# Patient Record
Sex: FEMALE | Race: WHITE | NOT HISPANIC OR LATINO | Employment: FULL TIME | URBAN - METROPOLITAN AREA
[De-identification: names, ages, dates, MRNs, and addresses within clinical notes are randomized per-mention and may not be internally consistent; named-entity substitution may affect disease eponyms.]

---

## 2019-01-16 ENCOUNTER — TELEPHONE (OUTPATIENT)
Dept: NEUROLOGY | Facility: CLINIC | Age: 69
End: 2019-01-16

## 2019-02-13 ENCOUNTER — TELEPHONE (OUTPATIENT)
Dept: NEUROLOGY | Facility: CLINIC | Age: 69
End: 2019-02-13

## 2019-02-13 RX ORDER — PAROXETINE HYDROCHLORIDE 20 MG/1
TABLET, FILM COATED ORAL
Refills: 0 | COMMUNITY
Start: 2019-01-08

## 2019-02-13 RX ORDER — ATORVASTATIN CALCIUM 20 MG/1
20 TABLET, FILM COATED ORAL DAILY
Refills: 0 | COMMUNITY
Start: 2019-01-08

## 2019-02-13 RX ORDER — LEVOTHYROXINE SODIUM 175 MCG
175 TABLET ORAL DAILY
Refills: 0 | COMMUNITY
Start: 2019-01-08 | End: 2021-10-26

## 2019-02-13 RX ORDER — TRIAMTERENE AND HYDROCHLOROTHIAZIDE 37.5; 25 MG/1; MG/1
1 TABLET ORAL DAILY
Refills: 0 | COMMUNITY
Start: 2019-01-08

## 2019-02-14 ENCOUNTER — OFFICE VISIT (OUTPATIENT)
Dept: NEUROLOGY | Facility: CLINIC | Age: 69
End: 2019-02-14
Payer: MEDICARE

## 2019-02-14 VITALS
SYSTOLIC BLOOD PRESSURE: 142 MMHG | BODY MASS INDEX: 33.91 KG/M2 | HEIGHT: 66 IN | HEART RATE: 97 BPM | WEIGHT: 211 LBS | DIASTOLIC BLOOD PRESSURE: 80 MMHG

## 2019-02-14 DIAGNOSIS — G43.109 OCULAR MIGRAINE: ICD-10-CM

## 2019-02-14 DIAGNOSIS — I67.9 CEREBROVASCULAR SMALL VESSEL DISEASE: Primary | ICD-10-CM

## 2019-02-14 DIAGNOSIS — H53.9 VISUAL DISTURBANCES: ICD-10-CM

## 2019-02-14 PROCEDURE — 99205 OFFICE O/P NEW HI 60 MIN: CPT | Performed by: PSYCHIATRY & NEUROLOGY

## 2019-02-14 NOTE — PROGRESS NOTES
Patient ID: Sukumar Park is a 80 y o  female  Assessment/Plan:     Problem List Items Addressed This Visit        Cardiovascular and Mediastinum    Cerebrovascular small vessel disease - Primary    Relevant Orders    VAS carotid complete study    Ocular migraine    Relevant Medications    PARoxetine (PAXIL) 20 mg tablet       Other    Visual disturbances    Relevant Orders    VAS carotid complete study         Today I had the pleasure of seeing your patient, Mary Munguia , in consultation at [address]  Mrs Nilda Whipple has presented for evaluation of transient kaleidoscopic visit with no headaches or complete vision loss  I would agree that TIA vs ocular migraine are on differential  Patient has complete resolution of her symptoms at this point  Patient does have small vessels disease with involvement of left occipital area and periventricular region  No T1W black holes, no corpus callosum involvement - no signs of ischemic or hemorrhagic changes, no demyelenation  We agreed to consider completing work up with Carotid Doppler US and starting aspirin 81 mg daily for 3-6 months  No other focal neurologic deficit has been described  Patient reports forgetfulness, with no specific signs of dementia or parkinsonism has been noted  We agreed that if patient feels she is mor forgetful, she will be advised to be further evaluation at [address]  Follow up on as needed bases  Subjective: abnormal brain MRI    HPI /History of Present Illness  Mrs Nilda Whipple has a history of Graves disease , dizziness, depression, glaucoma suspect, HTN, who presented for evaluation of visual dysfunction and abnormal brain MRI  Patient had Graves disease for 19 years - 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, but her sister had eye involvement  Patient stated she had sensation of perception of kaleidoscope and ophthalmology team decided she had a ocular migraine     Patient had those vision changes up 10 episodes, with visual problem free for up to 6 weeks  Patient described vision changes in mostly bitemporal area, like wavy and changes, but no central scotoma described  Visual disturbances are more colorful  No specific triggers  Patient had migraines many years ago with no visual disturbances  Perception of kaleidoscope last 5-10 min, no double vision or vision loss, no focal neurologic deficit, no headaches or any head sensation  More forgetful but no word findings   Patient stated she was more stressful;    Ophthalmology had evaluated the patient with borderline glaucoma OS 22 and OD 24     The following portions of the patient's history were reviewed and updated as appropriate:   She  has a past medical history of Anxiety, Depression, Graves disease, Hypertension, Migraine, and Thyroid disease  She   Patient Active Problem List    Diagnosis Date Noted    Cerebrovascular small vessel disease 02/14/2019    Visual disturbances 02/14/2019    Ocular migraine 02/14/2019     She  has no past surgical history on file  Her family history includes Diabetes in her brother, mother, and sister; Parkinsonism in her father  She  reports that she has been smoking  She has never used smokeless tobacco  She reports that she drinks alcohol  She reports that she does not use drugs  Current Outpatient Medications   Medication Sig Dispense Refill    atorvastatin (LIPITOR) 20 mg tablet Take 20 mg by mouth daily  0    PARoxetine (PAXIL) 20 mg tablet Take 20 mg by mouth daily  0    SYNTHROID 175 MCG tablet Take 175 mcg by mouth daily  0    triamterene-hydrochlorothiazide (MAXZIDE-25) 37 5-25 mg per tablet Take 1 tablet by mouth daily  0     No current facility-administered medications for this visit        Current Outpatient Medications on File Prior to Visit   Medication Sig    atorvastatin (LIPITOR) 20 mg tablet Take 20 mg by mouth daily    PARoxetine (PAXIL) 20 mg tablet Take 20 mg by mouth daily    SYNTHROID 175 MCG tablet Take 175 mcg by mouth daily    triamterene-hydrochlorothiazide (MAXZIDE-25) 37 5-25 mg per tablet Take 1 tablet by mouth daily    [DISCONTINUED] diazepam (VALIUM) 2 mg tablet Take 1 tablet (2 mg total) by mouth every 6 (six) hours as needed (vertigo) for up to 10 doses  No current facility-administered medications on file prior to visit  She has No Known Allergies            Objective:    Blood pressure 142/80, pulse 97, height 5' 6" (1 676 m), weight 95 7 kg (211 lb)  Physical Exam/Neurological Exam  CONSTITUTIONAL: NAD, pleasant  NECK: supple, no lymphadenopathy, no thyromegaly, no JVD  CARDIOVASCULAR: RRR, normal S1S2, no murmurs, no rubs  RESP: clear to auscultation bilaterally, no wheezes/rhonchi/rales  ABDOMEN: soft, non tender, non distended  SKIN: no rash or skin lesions  EXTREMITIES: no edema, pulses 2+bilaterally  PSYCH: appropriate mood and affect  NEUROLOGIC COMPREHENSIVE EXAM: Patient is oriented to person, place and time, NAD; appropriate affect  CN II, III, IV, V, VI, VII,VIII,IX,X,XI-XII intact with EOMI, PERRLA, OKN intact, VF grossly intact, fundi poorly visualized secondary to pupillary constriction; symmetric face noted  Motor: 5/5 UE/LE bilateral symmetric; Sensory: intact to light touch and pinprick bilaterally; normal vibration sensation feet bilaterally; Coordination within normal limits on FTN and JHONATAN testing; DTR: 2/4 through, no Babinski, no clonus  Tandem gait is intact  Romberg: abnormal       ROS:  12 points of review of system was reviewed with the patient and was unremarkable with exception: see HPI  Review of Systems   Constitutional: Negative  HENT: Negative  Eyes: Negative  Respiratory: Negative  Cardiovascular: Negative  Gastrointestinal: Negative  Endocrine: Negative  Genitourinary: Negative  Musculoskeletal: Negative  Skin: Negative  Hair loss   Allergic/Immunologic: Negative  Neurological: Negative  Hematological: Negative  Psychiatric/Behavioral: The patient is nervous/anxious

## 2019-02-28 ENCOUNTER — HOSPITAL ENCOUNTER (OUTPATIENT)
Dept: NON INVASIVE DIAGNOSTICS | Facility: CLINIC | Age: 69
Discharge: HOME | End: 2019-02-28
Payer: MEDICARE

## 2019-02-28 DIAGNOSIS — H53.9 VISUAL DISTURBANCES: ICD-10-CM

## 2019-02-28 DIAGNOSIS — I67.9 CEREBROVASCULAR SMALL VESSEL DISEASE: ICD-10-CM

## 2019-02-28 PROCEDURE — 93880 EXTRACRANIAL BILAT STUDY: CPT

## 2019-02-28 PROCEDURE — 93880 EXTRACRANIAL BILAT STUDY: CPT | Performed by: SURGERY

## 2019-03-06 ENCOUNTER — TELEPHONE (OUTPATIENT)
Dept: NEUROLOGY | Facility: CLINIC | Age: 69
End: 2019-03-06

## 2019-03-06 NOTE — TELEPHONE ENCOUNTER
Patient called stated that she was told the results of her ultrasound yesterday and was told it stated Less than 50% stenosis, she states she is not happy with that response and wants to know exactly what the number is on each side  I was unsure how to interpret study further, please advise

## 2019-03-06 NOTE — TELEPHONE ENCOUNTER
This question can be answered only by vascular surgical team or person who formally reviewed her imaging  If patient is unhappy - please let he follow with primary team for further referral to vascular surgeons

## 2019-03-14 NOTE — TELEPHONE ENCOUNTER
Pt made aware and states she is still unhappy with our answer  I apologized and tried to explain further however pt disconnected call

## 2020-10-15 ENCOUNTER — NURSE TRIAGE (OUTPATIENT)
Dept: OTHER | Facility: OTHER | Age: 70
End: 2020-10-15

## 2020-10-15 DIAGNOSIS — U07.1 COVID-19 DETERMINED BY CLINICAL DIAGNOSTIC CRITERIA: Primary | ICD-10-CM

## 2020-10-16 DIAGNOSIS — U07.1 COVID-19 DETERMINED BY CLINICAL DIAGNOSTIC CRITERIA: ICD-10-CM

## 2020-10-16 PROCEDURE — U0003 INFECTIOUS AGENT DETECTION BY NUCLEIC ACID (DNA OR RNA); SEVERE ACUTE RESPIRATORY SYNDROME CORONAVIRUS 2 (SARS-COV-2) (CORONAVIRUS DISEASE [COVID-19]), AMPLIFIED PROBE TECHNIQUE, MAKING USE OF HIGH THROUGHPUT TECHNOLOGIES AS DESCRIBED BY CMS-2020-01-R: HCPCS | Performed by: FAMILY MEDICINE

## 2020-10-17 LAB — SARS-COV-2 RNA SPEC QL NAA+PROBE: NOT DETECTED

## 2021-04-08 DIAGNOSIS — Z23 ENCOUNTER FOR IMMUNIZATION: ICD-10-CM

## 2021-10-26 ENCOUNTER — OFFICE VISIT (OUTPATIENT)
Dept: CARDIOLOGY CLINIC | Facility: CLINIC | Age: 71
End: 2021-10-26
Payer: MEDICARE

## 2021-10-26 VITALS
HEART RATE: 92 BPM | BODY MASS INDEX: 33.19 KG/M2 | SYSTOLIC BLOOD PRESSURE: 122 MMHG | DIASTOLIC BLOOD PRESSURE: 78 MMHG | HEIGHT: 65 IN | OXYGEN SATURATION: 97 % | WEIGHT: 199.2 LBS

## 2021-10-26 DIAGNOSIS — R00.0 TACHYCARDIA WITH HEART RATE 100-120 BEATS PER MINUTE: ICD-10-CM

## 2021-10-26 DIAGNOSIS — R06.02 SHORT OF BREATH ON EXERTION: ICD-10-CM

## 2021-10-26 DIAGNOSIS — R94.31 NONSPECIFIC ABNORMAL ELECTROCARDIOGRAM (ECG) (EKG): Primary | ICD-10-CM

## 2021-10-26 PROCEDURE — 93000 ELECTROCARDIOGRAM COMPLETE: CPT | Performed by: INTERNAL MEDICINE

## 2021-10-26 PROCEDURE — 99215 OFFICE O/P EST HI 40 MIN: CPT | Performed by: INTERNAL MEDICINE

## 2021-10-26 RX ORDER — ASPIRIN 81 MG/1
81 TABLET ORAL DAILY
COMMUNITY

## 2021-11-19 ENCOUNTER — HOSPITAL ENCOUNTER (OUTPATIENT)
Dept: PULMONOLOGY | Facility: HOSPITAL | Age: 71
Discharge: HOME | End: 2021-11-19
Payer: MEDICARE

## 2021-11-19 ENCOUNTER — HOSPITAL ENCOUNTER (OUTPATIENT)
Dept: NON INVASIVE DIAGNOSTICS | Facility: HOSPITAL | Age: 71
Discharge: HOME | End: 2021-11-19
Payer: MEDICARE

## 2021-11-19 ENCOUNTER — HOSPITAL ENCOUNTER (OUTPATIENT)
Dept: RADIOLOGY | Facility: HOSPITAL | Age: 71
Discharge: HOME | End: 2021-11-19
Payer: MEDICARE

## 2021-11-19 DIAGNOSIS — R00.0 TACHYCARDIA WITH HEART RATE 100-120 BEATS PER MINUTE: ICD-10-CM

## 2021-11-19 DIAGNOSIS — R94.31 NONSPECIFIC ABNORMAL ELECTROCARDIOGRAM (ECG) (EKG): ICD-10-CM

## 2021-11-19 DIAGNOSIS — R06.02 SHORT OF BREATH ON EXERTION: ICD-10-CM

## 2021-11-19 PROCEDURE — 71250 CT THORAX DX C-: CPT

## 2021-11-19 PROCEDURE — 94060 EVALUATION OF WHEEZING: CPT

## 2021-11-19 PROCEDURE — 94726 PLETHYSMOGRAPHY LUNG VOLUMES: CPT | Performed by: INTERNAL MEDICINE

## 2021-11-19 PROCEDURE — 93226 XTRNL ECG REC<48 HR SCAN A/R: CPT

## 2021-11-19 PROCEDURE — 94760 N-INVAS EAR/PLS OXIMETRY 1: CPT

## 2021-11-19 PROCEDURE — 93225 XTRNL ECG REC<48 HRS REC: CPT

## 2021-11-19 PROCEDURE — 94060 EVALUATION OF WHEEZING: CPT | Performed by: INTERNAL MEDICINE

## 2021-11-19 PROCEDURE — 94726 PLETHYSMOGRAPHY LUNG VOLUMES: CPT

## 2021-11-19 PROCEDURE — 94729 DIFFUSING CAPACITY: CPT

## 2021-11-19 PROCEDURE — 94729 DIFFUSING CAPACITY: CPT | Performed by: INTERNAL MEDICINE

## 2021-11-19 PROCEDURE — G1004 CDSM NDSC: HCPCS

## 2021-11-19 RX ORDER — ALBUTEROL SULFATE 2.5 MG/3ML
2.5 SOLUTION RESPIRATORY (INHALATION) ONCE
Status: COMPLETED | OUTPATIENT
Start: 2021-11-19 | End: 2021-11-19

## 2021-11-19 RX ADMIN — ALBUTEROL SULFATE 2.5 MG: 2.5 SOLUTION RESPIRATORY (INHALATION) at 08:39

## 2021-11-24 ENCOUNTER — HOSPITAL ENCOUNTER (OUTPATIENT)
Dept: NON INVASIVE DIAGNOSTICS | Facility: CLINIC | Age: 71
Discharge: HOME | End: 2021-11-24
Payer: MEDICARE

## 2021-11-24 DIAGNOSIS — R93.3 ABNORMAL FINDINGS ON DIAGNOSTIC IMAGING OF DIGESTIVE SYSTEM: Primary | ICD-10-CM

## 2021-11-24 DIAGNOSIS — R91.8 MULTIPLE LUNG NODULES ON CT: Primary | ICD-10-CM

## 2021-11-24 DIAGNOSIS — R06.02 SHORT OF BREATH ON EXERTION: ICD-10-CM

## 2021-11-24 DIAGNOSIS — R00.0 TACHYCARDIA WITH HEART RATE 100-120 BEATS PER MINUTE: ICD-10-CM

## 2021-11-24 DIAGNOSIS — R94.31 NONSPECIFIC ABNORMAL ELECTROCARDIOGRAM (ECG) (EKG): ICD-10-CM

## 2021-11-24 LAB
BASELINE ST DEPRESSION: 0 MM
CHEST PAIN STATEMENT: NORMAL
MAX DIASTOLIC BP: 90 MMHG
MAX HEART RATE: 160 BPM
MAX HR PERCENT: 105 %
MAX PREDICTED HEART RATE: 151 BPM
MAX. SYSTOLIC BP: 170 MMHG
PROTOCOL NAME: NORMAL
RATE PRESSURE PRODUCT: NORMAL
REASON FOR TERMINATION: NORMAL
SL CV STRESS RECOVERY BP: NORMAL MMHG
SL CV STRESS RECOVERY HR: 98 BPM
SL CV STRESS RECOVERY O2 SAT: 96 %
SL CV STRESS STAGE REACHED: 2
STRESS ANGINA INDEX: 0
STRESS BASELINE BP: NORMAL MMHG
STRESS BASELINE HR: 96 BPM
STRESS DUKE TREADMILL SCORE: 6
STRESS O2 SAT REST: 98 %
STRESS PEAK HR: 153 BPM
STRESS PERCENT HR: 105 %
STRESS POST ESTIMATED WORKLOAD: 7 METS
STRESS POST EXERCISE DUR MIN: 6 MIN
STRESS POST EXERCISE DUR SEC: 1 SEC
STRESS POST O2 SAT PEAK: 95 %
STRESS POST PEAK BP: 158 MMHG
STRESS ST DEPRESSION: 0 MM
STRESS TARGET HR: 160 BPM
TARGET HR FORMULA: NORMAL
TEST INDICATION: NORMAL
TIME IN EXERCISE PHASE: NORMAL

## 2021-11-24 PROCEDURE — 93227 XTRNL ECG REC<48 HR R&I: CPT | Performed by: INTERNAL MEDICINE

## 2021-11-24 PROCEDURE — 93351 STRESS TTE COMPLETE: CPT | Performed by: INTERNAL MEDICINE

## 2021-11-24 PROCEDURE — 93350 STRESS TTE ONLY: CPT

## 2021-11-26 ENCOUNTER — TELEPHONE (OUTPATIENT)
Dept: CARDIOLOGY CLINIC | Facility: CLINIC | Age: 71
End: 2021-11-26

## 2021-11-30 ENCOUNTER — OFFICE VISIT (OUTPATIENT)
Dept: CARDIOLOGY CLINIC | Facility: CLINIC | Age: 71
End: 2021-11-30
Payer: MEDICARE

## 2021-11-30 VITALS
WEIGHT: 204.7 LBS | OXYGEN SATURATION: 98 % | SYSTOLIC BLOOD PRESSURE: 120 MMHG | DIASTOLIC BLOOD PRESSURE: 70 MMHG | HEART RATE: 92 BPM | HEIGHT: 65 IN | BODY MASS INDEX: 34.1 KG/M2

## 2021-11-30 DIAGNOSIS — R93.3 ABNORMAL FINDINGS ON DIAGNOSTIC IMAGING OF DIGESTIVE SYSTEM: Primary | ICD-10-CM

## 2021-11-30 DIAGNOSIS — R91.8 MULTIPLE LUNG NODULES ON CT: ICD-10-CM

## 2021-11-30 DIAGNOSIS — R06.02 SHORT OF BREATH ON EXERTION: ICD-10-CM

## 2021-11-30 PROCEDURE — 99214 OFFICE O/P EST MOD 30 MIN: CPT | Performed by: INTERNAL MEDICINE

## 2022-03-20 ENCOUNTER — TELEPHONE (OUTPATIENT)
Dept: OTHER | Facility: OTHER | Age: 72
End: 2022-03-20

## 2022-03-30 ENCOUNTER — TELEPHONE (OUTPATIENT)
Dept: OTHER | Facility: OTHER | Age: 72
End: 2022-03-30

## 2022-03-30 NOTE — TELEPHONE ENCOUNTER
Bonny Lomeli (Self) [phone] (M)     Is calling to cancel her appointment and will call back to reschedule            Name: Bonny Lomeli MRN: [id number]   Date: 3/31/2022 Status: Mac   Time: 8:40 AM Length: 60   Visit Type: CONSULT PG [[id number]] Copay: $0 00   Provider: Miriam Silva MD Department: [facility] [place]

## 2022-07-27 ENCOUNTER — CONSULT (OUTPATIENT)
Dept: GASTROENTEROLOGY | Facility: CLINIC | Age: 72
End: 2022-07-27
Payer: MEDICARE

## 2022-07-27 VITALS
TEMPERATURE: 97.4 F | HEIGHT: 65 IN | BODY MASS INDEX: 32.62 KG/M2 | DIASTOLIC BLOOD PRESSURE: 70 MMHG | SYSTOLIC BLOOD PRESSURE: 120 MMHG | WEIGHT: 195.8 LBS

## 2022-07-27 DIAGNOSIS — Z12.11 COLON CANCER SCREENING: ICD-10-CM

## 2022-07-27 DIAGNOSIS — R93.3 ABNORMAL FINDING ON GI TRACT IMAGING: Primary | ICD-10-CM

## 2022-07-27 PROCEDURE — 99204 OFFICE O/P NEW MOD 45 MIN: CPT | Performed by: INTERNAL MEDICINE

## 2022-07-27 RX ORDER — LORAZEPAM 1 MG/1
1 TABLET ORAL EVERY 8 HOURS PRN
COMMUNITY

## 2022-07-27 NOTE — PATIENT INSTRUCTIONS
Scheduled date of EGD/colonoscopy (as of today):  10/10/22  Physician performing EGD/colonoscopy:  Dr Katharina Orourke  [facility] of [facility]colonoscopy[facility]  Desired bowel prep reviewed with patient:  Mary/Dulcolax  Instructions reviewed with patient by:  Neri Bolaños  Clearances:   n/a

## 2022-07-27 NOTE — PROGRESS NOTES
[facility] - Outpatient Consultation  Isiah Goldstein 80 y o  female MRN: [id number]  Encounter: [id number]      PCP: Austin Simon MD  Referring: No referring provider defined for this encounter  ASSESSMENT AND PLAN:      1  Abnormal finding on GI tract imaging    · Patient had a CT chest in November 2021 which showed asymmetric drinking at [address] [place] [address] & [facility] junction  · Will perform an upper endoscopy for further evaluation of abnormal imaging finding    2  Colon cancer screening    Patient had a colonoscopy in 2021 which we had to be aborted due to emesis during the procedure  Colonoscopy report reviewed and it appears that she was unable to tolerate insufflation and therefore the procedure had to be aborted  At this time she does not want to undergo Cologuard as she feels like a positive test would stress her out  She is interested in CT colonography however we discussed that given that she is going to be prepping for CT colonography and as we are planning for her to undergo upper endoscopy for evaluation of abnormal imaging finding we should attempt a colonoscopy  Her father had colon cancer in his 76s    - Colonoscopy; Future  - polyethylene glycol (GOLYTELY) 4000 mL solution; Take 4,000 mL by mouth once for 1 dose  Dispense: 4000 mL; Refill: 0  - bisacodyl (DULCOLAX) 5 mg EC tablet; Take 2 tablets (10 mg total) by mouth once for 1 dose Take once at 4pm evening before the procedure  Dispense: 2 tablet; Refill: Grover Putnam MD   Gastroenterology Fellow     ______________________________________________________________________    CC:  Chief Complaint   Patient presents with    Screening Colonoscopy     Pt here for colon screen, last one not done due to vomiting  HPI:  80 yr old F who presents to gastroenterology clinic for colon cancer screening      Patient states that she was at [facility] last year for colonoscopy and during the procedure she had emesis for which reason the colonoscopy had to be aborted  At this time she is hesitant about getting a repeat colonoscopy  Also had abnormal imaging, CT chest in November 2021 showed asymmetric thickening at GE junction likely related to hiatal hernia  Colonoscopy recommended to rule out any possibility of mural lesion  She denies any dysphagia  Does endorse acid reflux  REVIEW OF SYSTEMS:    CONSTITUTIONAL: Denies any fever, chills, rigors, and weight loss  HEENT: No earache or tinnitus  Denies hearing loss or visual disturbances  CARDIOVASCULAR: No chest pain or palpitations  RESPIRATORY: Denies any cough, hemoptysis, shortness of breath or dyspnea on exertion  GASTROINTESTINAL: As noted in the History of Present Illness  GENITOURINARY: No problems with urination  Denies any hematuria or dysuria  NEUROLOGIC: No dizziness or vertigo, denies headaches  MUSCULOSKELETAL: Denies any muscle or joint pain  SKIN: Denies skin rashes or itching  ENDOCRINE: Denies excessive thirst  Denies intolerance to heat or cold  PSYCHOSOCIAL: Denies depression or anxiety  Denies any recent memory loss         Historical Information   Past Medical History:   Diagnosis Date    Anxiety     Chronic kidney disease, stage III (moderate) ([facility] [address] )     Depression     Fluid retention     Graves disease     Hypertension, essential     Hypothyroidism     Insomnia     Migraine     Osteoporosis      Past Surgical History:   Procedure Laterality Date    THYROIDECTOMY       Social History   Social History     Substance and Sexual Activity   Alcohol Use Yes    Comment: socially     Social History     Substance and Sexual Activity   Drug Use Never     Social History     Tobacco Use   Smoking Status Current Some Day Smoker    Packs/day: 0 50   Smokeless Tobacco Never Used     Family History   Problem Relation Age of Onset    Diabetes Mother    Karol Willis Parkinsonism Father     Diabetes Sister     Breast cancer Sister         bi-lateral masectomy  Dementia Sister         frontoemporal demantia, age 61     Diabetes Brother        Meds/Allergies       Current Outpatient Medications:     aspirin (ECOTRIN LOW STRENGTH) 81 mg EC tablet    atorvastatin (LIPITOR) 20 mg tablet    bisacodyl (DULCOLAX) 5 mg EC tablet    levothyroxine 150 mcg tablet    LORazepam (ATIVAN) 1 mg tablet    PARoxetine (PAXIL) 20 mg tablet    polyethylene glycol (GOLYTELY) 4000 mL solution    triamterene-hydrochlorothiazide (MAXZIDE-25) 37 5-25 mg per tablet    No Known Allergies        Objective     Blood pressure 120/70, temperature (!) 97 4 °F (36 3 °C), temperature source Tympanic, height 5' 5" (1 651 m), weight 88 8 kg (195 lb 12 8 oz)  Body mass index is 32 58 kg/m²  PHYSICAL EXAM:      General Appearance:   Alert, cooperative, no distress   HEENT:   Normocephalic, atraumatic, anicteric  Neck:  Supple, symmetrical, trachea midline   Lungs:   Clear to auscultation bilaterally; no rales, rhonchi or wheezing; respirations unlabored    Heart[de-identified]   Regular rate and rhythm; no murmur, rub, or gallop  Abdomen:   Soft, non-tender, non-distended; normal bowel sounds; no masses, no organomegaly    Genitalia:   Deferred    Rectal:   Deferred    Extremities:  No cyanosis, clubbing or edema    Pulses:  2+ and symmetric    Skin:  No jaundice, rashes, or lesions    Lymph nodes:  No palpable cervical lymphadenopathy        Lab Results:     Lab Results   Component Value Date    WBC 9 09 04/24/2016    HGB 14 7 04/24/2016    HCT 41 8 04/24/2016    MCV 91 04/24/2016     04/24/2016       Lab Results   Component Value Date    K 3 5 04/24/2016     04/24/2016    CO2 31 04/24/2016    BUN 16 04/24/2016    CREATININE 0 89 04/24/2016    CALCIUM 9 3 04/24/2016    AST 20 04/24/2016    ALT 28 04/24/2016    ALKPHOS 91 04/24/2016    EGFR >60 0 04/24/2016       No results found for: INR, PROTIME      Radiology Results:   No results found      Portions of the record may have been created with voice recognition software  Occasional wrong word or "sound a like" substitutions may have occurred due to the inherent limitations of voice recognition software  Read the chart carefully and recognize, using context, where substitutions have occurred

## 2022-10-07 ENCOUNTER — TELEPHONE (OUTPATIENT)
Dept: GASTROENTEROLOGY | Facility: CLINIC | Age: 72
End: 2022-10-07

## 2022-10-07 NOTE — TELEPHONE ENCOUNTER
Patients GI provider:  Dr April Leon    Number to return call: [phone]    Reason for call: Pt calling to reschedule her procedure  Above is her number       Scheduled procedure/appointment date if applicable: Apt/procedure NA

## 2022-10-19 NOTE — TELEPHONE ENCOUNTER
Scheduled date of EGD/colonoscopy (as of today): 12/29/22  Physician performing EGD/colonoscopy: Dr Reynoso  Location of EGD/colonoscopy: [facility] bowel prep reviewed with patient: Golytely  Instructions reviewed with patient by: Quiana/ramin  Clearances:  N/A

## 2022-11-14 ENCOUNTER — TELEPHONE (OUTPATIENT)
Dept: PSYCHIATRY | Facility: CLINIC | Age: 72
End: 2022-11-14

## 2022-11-14 NOTE — TELEPHONE ENCOUNTER
Patient contacted intake dept in attempts to get scheduled for med mgmt services  Patient was notified of the waitlist and suggested to receive referral from pcp and reach out to insurance company for additional resources  Patient added to non-referral waitlist and extra resource packet sent

## 2022-11-16 ENCOUNTER — HOSPITAL ENCOUNTER (OUTPATIENT)
Dept: CT IMAGING | Facility: HOSPITAL | Age: 72
Discharge: HOME | End: 2022-11-16

## 2022-11-16 DIAGNOSIS — R91.8 MULTIPLE LUNG NODULES ON CT: ICD-10-CM

## 2022-11-16 DIAGNOSIS — R93.3 ABNORMAL FINDINGS ON DIAGNOSTIC IMAGING OF DIGESTIVE SYSTEM: ICD-10-CM

## 2022-11-16 DIAGNOSIS — R06.02 SHORT OF BREATH ON EXERTION: ICD-10-CM

## 2022-11-16 RX ADMIN — IOHEXOL 85 ML: 350 INJECTION, SOLUTION INTRAVENOUS at 09:21

## 2022-11-30 ENCOUNTER — OFFICE VISIT (OUTPATIENT)
Dept: CARDIOLOGY CLINIC | Facility: CLINIC | Age: 72
End: 2022-11-30

## 2022-11-30 VITALS
HEIGHT: 65 IN | DIASTOLIC BLOOD PRESSURE: 76 MMHG | BODY MASS INDEX: 33.17 KG/M2 | SYSTOLIC BLOOD PRESSURE: 120 MMHG | WEIGHT: 199.1 LBS | HEART RATE: 88 BPM | OXYGEN SATURATION: 97 %

## 2022-11-30 DIAGNOSIS — R94.31 NONSPECIFIC ABNORMAL ELECTROCARDIOGRAM (ECG) (EKG): ICD-10-CM

## 2022-11-30 DIAGNOSIS — I25.10 CORONARY ARTERY CALCIFICATION SEEN ON CAT SCAN: ICD-10-CM

## 2022-11-30 DIAGNOSIS — R06.02 SHORT OF BREATH ON EXERTION: ICD-10-CM

## 2022-11-30 DIAGNOSIS — R91.8 MULTIPLE LUNG NODULES ON CT: Primary | ICD-10-CM

## 2022-11-30 DIAGNOSIS — E78.5 HYPERLIPIDEMIA, UNSPECIFIED HYPERLIPIDEMIA TYPE: Primary | ICD-10-CM

## 2022-11-30 DIAGNOSIS — R93.3 ABNORMAL FINDINGS ON DIAGNOSTIC IMAGING OF DIGESTIVE SYSTEM: ICD-10-CM

## 2022-11-30 RX ORDER — ATORVASTATIN CALCIUM 40 MG/1
40 TABLET, FILM COATED ORAL DAILY
Qty: 90 TABLET | Refills: 3 | Status: SHIPPED | OUTPATIENT
Start: 2022-11-30

## 2022-11-30 RX ORDER — ATORVASTATIN CALCIUM 40 MG/1
40 TABLET, FILM COATED ORAL DAILY
COMMUNITY
End: 2022-11-30 | Stop reason: SDUPTHER

## 2022-11-30 RX ORDER — PAROXETINE 30 MG/1
30 TABLET, FILM COATED ORAL EVERY MORNING
COMMUNITY

## 2022-11-30 NOTE — TELEPHONE ENCOUNTER
Requested medication(s) are due for refill today: YES  Patient has already received a courtesy refill: NO  Other reason request has been forwarded to provider: Ruth Ann Arce

## 2022-12-01 ENCOUNTER — CONSULT (OUTPATIENT)
Dept: PULMONOLOGY | Facility: CLINIC | Age: 72
End: 2022-12-01

## 2022-12-01 VITALS
WEIGHT: 199 LBS | RESPIRATION RATE: 18 BRPM | HEIGHT: 65 IN | TEMPERATURE: 97 F | SYSTOLIC BLOOD PRESSURE: 124 MMHG | OXYGEN SATURATION: 98 % | DIASTOLIC BLOOD PRESSURE: 72 MMHG | HEART RATE: 102 BPM | BODY MASS INDEX: 33.15 KG/M2

## 2022-12-01 DIAGNOSIS — G47.19 EXCESSIVE DAYTIME SLEEPINESS: ICD-10-CM

## 2022-12-01 DIAGNOSIS — F17.200 CURRENT SMOKER: ICD-10-CM

## 2022-12-01 DIAGNOSIS — J44.9 CHRONIC OBSTRUCTIVE PULMONARY DISEASE, UNSPECIFIED COPD TYPE (HCC): ICD-10-CM

## 2022-12-01 DIAGNOSIS — G47.00 INSOMNIA, UNSPECIFIED TYPE: Primary | ICD-10-CM

## 2022-12-01 DIAGNOSIS — R93.89 ABNORMAL CT OF THE CHEST: ICD-10-CM

## 2022-12-01 RX ORDER — TRAZODONE HYDROCHLORIDE 50 MG/1
50 TABLET ORAL
Qty: 30 TABLET | Refills: 0 | Status: SHIPPED | OUTPATIENT
Start: 2022-12-01

## 2022-12-01 NOTE — PROGRESS NOTES
Sleep Consultation   Terrence Horton 71 y o  female MRN: [id number]      Reason for consultation: Abnormal CT chest    Requesting physician: Dr Agapito Powell    Assessment/Plan  71 y o  F with PMHx of HTN, hypothyroidism, CKD stage 3, Migraine, Depression and anxiety who comes in for management of abnormal CT chest   1   Abnormal CT chest - multiple pulmonary nodules  They are stable after 1 year  These are likely benign or inflammatory  -  We will follow CT in 1 year      -   We discussed smoking cessation as below  2   Current everyday smoker/Mild COPD          - We discussed different treatment options  She would like to stop smoking on her own  -  We can trial Spiriva if she develops symptoms  3   Chronic insomnia - likely due to profound anxiety, possible RLS and possible GABBIE  -  I will trial some Trazodone to see if that helps her insomnia  -  I recommended she complete a sleep diary as well        -  She will benefit from CBT-I as well in the future    4  Daytime sleepiness - Mallampati class IV  She is at risk for GABBIE  - I recommend a home sleep study to assess for obstructive sleep apnea       -  I discussed in depth the diagnostic studies and treatment options involved with obstructive sleep apnea      -  I also discussed in depth the risk of leaving sleep apnea untreated including hypertension, heart failure, arrhythmia, MI and stroke  -  The patient is agreeable to undergo testing and treatment of obstructive sleep apnea  She understands that pitfalls she may encounter along the way and is willing to attempt CPAP treatment  History of Present Illness   HPI:  Terrence Horton is a 71 y o  female with PMHx as below who comes in for evaluation of abnormal CT chest   She states that she had that done for shortness of breath  She had coronary artery calcifications and underwent cardiac work up    She also was noted to have pulmonary nodules on these CTs and wanted to get an opinion for what needs to be done  She states that she is an active smoker  She has been smoking on and off for 40 years at about 1 pack per day  She denies any wheezing, chest tightness, cough, or dyspnea on exertion  She has no occupation exposures, pets or hobbies with exposures  She denies rashes, joint changes  She also mentions that she suffers significantly with her sleep  Her sleep is very fragmented  She will sleep 2-3 hrs at a time  She is tired through the day and will nap twice a day  She will worry often about her sisters who are ill and this will prevent her from falling asleep  She also had some recently pass away which contributes to her sleepiness  ROS:   Review of Systems   Constitutional: Positive for fatigue  Negative for appetite change  HENT: Negative  Eyes: Negative  Respiratory: Negative  Cardiovascular: Negative  Gastrointestinal: Negative  Endocrine: Negative  Genitourinary: Negative  Musculoskeletal: Negative  Skin: Negative  Allergic/Immunologic: Negative  Neurological: Negative  Hematological: Negative  Psychiatric/Behavioral: Positive for behavioral problems, dysphoric mood and sleep disturbance  The patient is nervous/anxious                Historical Information   Past Medical History:   Diagnosis Date   • Anxiety    • Chronic kidney disease, stage III (moderate) (HCC)    • Depression    • Fluid retention    • GERD (gastroesophageal reflux disease) 30 years ago   • Graves disease    • Hypertension    • Hypertension, essential    • Hypothyroidism    • Insomnia    • Migraine    • Osteoporosis      Past Surgical History:   Procedure Laterality Date   • THYROIDECTOMY       Family History   Problem Relation Age of Onset   • Diabetes Mother    • Parkinsonism Father    • Diabetes Sister    • Breast cancer Sister         bi-lateral masectomy    • Dementia Sister         [facility] 61    • Diabetes Brother      Social History     Socioeconomic History   • Marital status:      Spouse name: Not on file   • Number of children: Not on file   • Years of education: Not on file   • Highest education level: Not on file   Occupational History   • Not on file   Tobacco Use   • Smoking status: Every Day     Packs/day: 1 00     Years: 51 00     Pack years: 51 00     Types: Cigarettes     Start date:    • Smokeless tobacco: Never   • Tobacco comments:     Smokes a pack a day   Vaping Use   • Vaping Use: Never used   Substance and Sexual Activity   • Alcohol use: Not Currently     Comment: rare   • Drug use: Never   • Sexual activity: Not Currently     Partners: Male     Comment:    Other Topics Concern   • Not on file   Social History Narrative    Most recent tobacco use screenin2019      Social Determinants of Health     Financial Resource Strain: Not on file   Food Insecurity: Not on file   Transportation Needs: Not on file   Physical Activity: Not on file   Stress: Not on file   Social Connections: Not on file   Intimate Partner Violence: Not on file   Housing Stability: Not on file       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  Authorizing Provider   aspirin (ECOTRIN LOW STRENGTH) 81 mg EC tablet Take 81 mg by mouth daily   Yes Historical Provider, MD   atorvastatin (LIPITOR) 40 mg tablet Take 1 tablet (40 mg total) by mouth daily 22  Yes Amada Yang MD   levothyroxine 150 mcg tablet Take 150 mcg by mouth daily 20  Yes Historical Provider, MD   LORazepam (ATIVAN) 1 mg tablet Take 1 mg by mouth every 8 (eight) hours as needed for anxiety   Yes Historical Provider, MD   PARoxetine (PAXIL) 30 mg tablet Take 30 mg by mouth every morning   Yes Historical Provider, MD   traZODone (DESYREL) 50 mg tablet Take 1 tablet (50 mg total) by mouth daily at bedtime 12/1/22  Yes Ashish García,    triamterene-hydrochlorothiazide (MAXZIDE-25) 37 5-25 mg per tablet Take 1 tablet by mouth daily 1/8/19  Yes Historical Provider, MD   bisacodyl (DULCOLAX) 5 mg EC tablet Take 2 tablets (10 mg total) by mouth once for 1 dose Take once at 4pm evening before the procedure 7/27/22 7/27/22  Rajwinder Fabian MD   polyethylene glycol (GOLYTELY) 4000 mL solution Take 4,000 mL by mouth once for 1 dose 7/27/22 7/27/22  Rajwinder Fabian MD       Vitals:   Blood pressure 124/72, pulse 102, temperature (!) 97 °F (36 1 °C), temperature source Tympanic, resp  rate 18, height 5' 5" (1 651 m), weight 90 3 kg (199 lb), SpO2 98 % , RA, Body mass index is 33 12 kg/m²  Physical Exam  General: Pleasant, Awake alert and oriented x 3, conversant without conversational dyspnea, NAD, normal affect  HEENT:  PERRL, Sclera noninjected, nonicteric OU, Nares patent,  no craniofacial abnormalities, Mucous membranes, moist, no oral lesions, normal dentition, Mallampati class 3  NECK: Trachea midline, no accessory muscle use, no stridor, no cervical or supraclavicular adenopathy, JVP not elevated  CARDIAC: Reg, single s1/S2, no m/r/g  PULM: CTA bilaterally no wheezing, rhonchi or rales  ABD: Normoactive bowel sounds, soft nontender, nondistended, no rebound, no rigidity, no guarding  EXT: No cyanosis, no clubbing, no edema, normal capillary refill  NEURO: no focal neurologic deficits, AAOx3, moving all extremities appropriately    Labs: I have personally reviewed pertinent lab results    Lab Results   Component Value Date    WBC 9 09 04/24/2016    HGB 14 7 04/24/2016    HCT 41 8 04/24/2016    MCV 91 04/24/2016     04/24/2016      Lab Results   Component Value Date    CALCIUM 9 3 04/24/2016    K 3 5 04/24/2016    CO2 31 04/24/2016     04/24/2016    BUN 16 04/24/2016    CREATININE 0 89 04/24/2016     PFT  Results:  FEV1/FVC Ratio: 70 %  Forced Vital Capacity: 2 45 L           83 % predicted  FEV1: 1 72 L   75 % predicted  After administration of bronchodilator   FEV1/FVC: 67%  FVC: 2 98 L, 101 % predicted, +21 % change  FEV1: 1 98 L, 86 % predicted, +15 % change  Lung volumes by body plethysmography:   Total Lung Capacity 122 % predicted   Residual volume 176 % predicted  RV/TLC Ratio: 144 %  DLCO corrected for patients hemoglobin level: 75 %  Interpretation:  • Mild obstructive airflow defect on spirometry  • Significant improvement in airflow or forced vital capacity in response to the administration of bronchodilator per ATS standards  • Air trapping and hyperinflation as indicated by the lung volumes  • Mild decrease in diffusion capacity  • Flow-volume loop consistent with obstruction    CT chest 11/16/22  LUNGS:  Scattered stable pulmonary nodules, for instance a 2 mm upper lobe nodule (series 3 image 34), 2 mm left upper lobe nodule (series 3, image 32), 3 mm left lower lobe nodule (series 3, image 57), 3 mm left lobe nodule (series 3 image 64)  There   is no tracheal or endobronchial lesion  PLEURA:  Unremarkable  HEART/GREAT VESSELS: Heavy atherosclerotic coronary artery calcification is noted  Heart is otherwise unremarkable  No thoracic aortic aneurysm   MEDIASTINUM AND MIGUELITO:  Moderate size hiatal hernia       CT chest 11/19/21  IMPRESSION:  1  Several small lung nodules  Based on current Fleischner Society 2017 Guidelines on incidental pulmonary nodule, because the patient is considered high risk for lung cancer, 12 month follow-up non-contrast chest CT is recommended  2   Asymmetric thickening at the GE junction which most likely represents a hiatal hernia    However, given the asymmetry a follow-up barium swallow or endoscopy is recommended to confirm a hernia and exclude the possibility of a mural lesion      Echo - 11/24/21  •  Left Ventricle: Left ventricular cavity size is normal  Systolic function is normal  Wall motion is normal   •  Mitral Valve: There is moderate annular calcification  •  Stress ECG: No ST deviation is noted  Arrhythmias during stress: rare PVCs and 1 pair  The stress ECG is negative for ischemia after maximal exercise, without reproduction of symptoms  •  Peak Stress Echo: Left ventricle cavity has normal reduction in size at peak stress  The left ventricle systolic function is normal at peak stress   The peak stress echo showed normal wall motion                               DO Adele Landrum 73 Sleep Physician

## 2022-12-01 NOTE — LETTER
December 1, 2022     Es Parker MD  [address] [facility]  [address]  [address] [place] [address]    Patient: Cara Kaye   YOB: 1950   Date of Visit: 12/1/2022       Dear Dr Macrina Morales: Thank you for referring Deepthi Close to me for evaluation  Below are my notes for this consultation  If you have questions, please do not hesitate to call me  I look forward to following your patient along with you  Sincerely,        Kennedy Dhaliwal DO        CC: No Recipients  Kennedy Dhaliwal DO  12/1/2022  1:53 PM  Sign when Signing Visit  Sleep Consultation   Cara Kaye 71 y o  female MRN: [id number]      Reason for consultation: Abnormal CT chest    Requesting physician: Dr Macrina Morales    Assessment/Plan  71 y o  F with PMHx of HTN, hypothyroidism, CKD stage 3, Migraine, Depression and anxiety who comes in for management of abnormal CT chest   1   Abnormal CT chest - multiple pulmonary nodules  They are stable after 1 year  These are likely benign or inflammatory  -  We will follow CT in 1 year      -   We discussed smoking cessation as below  2   Current everyday smoker/Mild COPD          - We discussed different treatment options  She would like to stop smoking on her own  -  We can trial Spiriva if she develops symptoms  3   Chronic insomnia - likely due to profound anxiety, possible RLS and possible GABBIE  -  I will trial some Trazodone to see if that helps her insomnia  -  I recommended she complete a sleep diary as well        -  She will benefit from CBT-I as well in the future    4  Daytime sleepiness - Mallampati class IV  She is at risk for GABBIE            - I recommend a home sleep study to assess for obstructive sleep apnea       -  I discussed in depth the diagnostic studies and treatment options involved with obstructive sleep apnea      -  I also discussed in depth the risk of leaving sleep apnea untreated including hypertension, heart failure, arrhythmia, MI and stroke  -  The patient is agreeable to undergo testing and treatment of obstructive sleep apnea  She understands that pitfalls she may encounter along the way and is willing to attempt CPAP treatment  History of Present Illness   HPI:  Annel Conley is a 71 y o  female with PMHx as below who comes in for evaluation of abnormal CT chest   She states that she had that done for shortness of breath  She had coronary artery calcifications and underwent cardiac work up  She also was noted to have pulmonary nodules on these CTs and wanted to get an opinion for what needs to be done  She states that she is an active smoker  She has been smoking on and off for 40 years at about 1 pack per day  She denies any wheezing, chest tightness, cough, or dyspnea on exertion  She has no occupation exposures, pets or hobbies with exposures  She denies rashes, joint changes  She also mentions that she suffers significantly with her sleep  Her sleep is very fragmented  She will sleep 2-3 hrs at a time  She is tired through the day and will nap twice a day  She will worry often about her sisters who are ill and this will prevent her from falling asleep  She also had some recently pass away which contributes to her sleepiness  ROS:   Review of Systems   Constitutional: Positive for fatigue  Negative for appetite change  HENT: Negative  Eyes: Negative  Respiratory: Negative  Cardiovascular: Negative  Gastrointestinal: Negative  Endocrine: Negative  Genitourinary: Negative  Musculoskeletal: Negative  Skin: Negative  Allergic/Immunologic: Negative  Neurological: Negative  Hematological: Negative  Psychiatric/Behavioral: Positive for behavioral problems, dysphoric mood and sleep disturbance  The patient is nervous/anxious                Historical Information   Past Medical History:   Diagnosis Date   • Anxiety    • Chronic kidney disease, stage III (moderate) (HCC)    • Depression    • Fluid retention    • GERD (gastroesophageal reflux disease) 30 years ago   • Graves disease    • Hypertension    • Hypertension, essential    • Hypothyroidism    • Insomnia    • Migraine    • Osteoporosis      Past Surgical History:   Procedure Laterality Date   • THYROIDECTOMY       Family History   Problem Relation Age of Onset   • Diabetes Mother    • Parkinsonism Father    • Diabetes Sister    • Breast cancer Sister         bi-lateral masectomy    • Dementia Sister         frontoemporal demantia, age 61    • Diabetes Brother      Social History     Socioeconomic History   • Marital status:      Spouse name: Not on file   • Number of children: Not on file   • Years of education: Not on file   • Highest education level: Not on file   Occupational History   • Not on file   Tobacco Use   • Smoking status: Every Day     Packs/day: 1 00     Years: 51 00     Pack years: 51 00     Types: Cigarettes     Start date:    • Smokeless tobacco: Never   • Tobacco comments:     Smokes a pack a day   Vaping Use   • Vaping Use: Never used   Substance and Sexual Activity   • Alcohol use: Not Currently     Comment: rare   • Drug use: Never   • Sexual activity: Not Currently     Partners: Male     Comment:    Other Topics Concern   • Not on file   Social History Narrative    Most recent tobacco use screenin2019      Social Determinants of Health     Financial Resource Strain: Not on file   Food Insecurity: Not on file   Transportation Needs: Not on file   Physical Activity: Not on file   Stress: Not on file   Social Connections: Not on file   Intimate Partner Violence: Not on file   Housing Stability: Not on file       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  Authorizing Provider   aspirin (ECOTRIN LOW STRENGTH) 81 mg EC tablet Take 81 mg by mouth daily   Yes Historical Provider, MD   atorvastatin (LIPITOR) 40 mg tablet Take 1 tablet (40 mg total) by mouth daily 11/30/22  Yes Tequila Hopkins MD   levothyroxine 150 mcg tablet Take 150 mcg by mouth daily 6/7/20  Yes Historical Provider, MD   LORazepam (ATIVAN) 1 mg tablet Take 1 mg by mouth every 8 (eight) hours as needed for anxiety   Yes Historical Provider, MD   PARoxetine (PAXIL) 30 mg tablet Take 30 mg by mouth every morning   Yes Historical Provider, MD   traZODone (DESYREL) 50 mg tablet Take 1 tablet (50 mg total) by mouth daily at bedtime 12/1/22  Yes Harriet Hanson,    triamterene-hydrochlorothiazide (MAXZIDE-25) 37 5-25 mg per tablet Take 1 tablet by mouth daily 1/8/19  Yes Historical Provider, MD   bisacodyl (DULCOLAX) 5 mg EC tablet Take 2 tablets (10 mg total) by mouth once for 1 dose Take once at 4pm evening before the procedure 7/27/22 7/27/22  Marleni Arriola MD   polyethylene glycol (GOLYTELY) 4000 mL solution Take 4,000 mL by mouth once for 1 dose 7/27/22 7/27/22  Marleni Arriola MD       Vitals:   Blood pressure 124/72, pulse 102, temperature (!) 97 °F (36 1 °C), temperature source Tympanic, resp  rate 18, height 5' 5" (1 651 m), weight 90 3 kg (199 lb), SpO2 98 % , RA, Body mass index is 33 12 kg/m²         Physical Exam  General: Pleasant, Awake alert and oriented x 3, conversant without conversational dyspnea, NAD, normal affect  HEENT:  PERRL, Sclera noninjected, nonicteric OU, Nares patent,  no craniofacial abnormalities, Mucous membranes, moist, no oral lesions, normal dentition, Mallampati class 3  NECK: Trachea midline, no accessory muscle use, no stridor, no cervical or supraclavicular adenopathy, JVP not elevated  CARDIAC: Reg, single s1/S2, no m/r/g  PULM: CTA bilaterally no wheezing, rhonchi or rales  ABD: Normoactive bowel sounds, soft nontender, nondistended, no rebound, no rigidity, no guarding  EXT: No cyanosis, no clubbing, no edema, normal capillary refill  NEURO: no focal neurologic deficits, AAOx3, moving all extremities appropriately    Labs: I have personally reviewed pertinent lab results  Lab Results   Component Value Date    WBC 9 09 04/24/2016    HGB 14 7 04/24/2016    HCT 41 8 04/24/2016    MCV 91 04/24/2016     04/24/2016      Lab Results   Component Value Date    CALCIUM 9 3 04/24/2016    K 3 5 04/24/2016    CO2 31 04/24/2016     04/24/2016    BUN 16 04/24/2016    CREATININE 0 89 04/24/2016     PFT  Results:  FEV1/FVC Ratio: 70 %  Forced Vital Capacity: 2 45 L           83 % predicted  FEV1: 1 72 L   75 % predicted  After administration of bronchodilator   FEV1/FVC: 67%  FVC: 2 98 L, 101 % predicted, +21 % change  FEV1: 1 98 L, 86 % predicted, +15 % change  Lung volumes by body plethysmography:   Total Lung Capacity 122 % predicted   Residual volume 176 % predicted  RV/TLC Ratio: 144 %  DLCO corrected for patients hemoglobin level: 75 %  Interpretation:  • Mild obstructive airflow defect on spirometry  • Significant improvement in airflow or forced vital capacity in response to the administration of bronchodilator per ATS standards  • Air trapping and hyperinflation as indicated by the lung volumes  • Mild decrease in diffusion capacity  • Flow-volume loop consistent with obstruction    CT chest 11/16/22  LUNGS:  Scattered stable pulmonary nodules, for instance a 2 mm upper lobe nodule (series 3 image 34), 2 mm left upper lobe nodule (series 3, image 32), 3 mm left lower lobe nodule (series 3, image 57), 3 mm left lobe nodule (series 3 image 64)  There   is no tracheal or endobronchial lesion  PLEURA:  Unremarkable  HEART/GREAT VESSELS: Heavy atherosclerotic coronary artery calcification is noted  Heart is otherwise unremarkable  No thoracic aortic aneurysm   MEDIASTINUM AND MIGUELITO:  Moderate size hiatal hernia       CT chest 11/19/21  IMPRESSION:  1  Several small lung nodules   Based on current Fleischner Society 2017 Guidelines on incidental pulmonary nodule, because the patient is considered high risk for lung cancer, 12 month follow-up non-contrast chest CT is recommended  2   Asymmetric thickening at the GE junction which most likely represents a hiatal hernia  However, given the asymmetry a follow-up barium swallow or endoscopy is recommended to confirm a hernia and exclude the possibility of a mural lesion      Echo - 11/24/21  •  Left Ventricle: Left ventricular cavity size is normal  Systolic function is normal  Wall motion is normal   •  Mitral Valve: There is moderate annular calcification  •  Stress ECG: No ST deviation is noted  Arrhythmias during stress: rare PVCs and 1 pair  The stress ECG is negative for ischemia after maximal exercise, without reproduction of symptoms  •  Peak Stress Echo: Left ventricle cavity has normal reduction in size at peak stress  The left ventricle systolic function is normal at peak stress   The peak stress echo showed normal wall motion                               DO Uli Parrish Sleep Physician

## 2022-12-23 ENCOUNTER — TELEPHONE (OUTPATIENT)
Dept: GASTROENTEROLOGY | Facility: CLINIC | Age: 72
End: 2022-12-23

## 2022-12-31 LAB
ALBUMIN SERPL-MCNC: 4.1 G/DL (ref 3.6–5.1)
ALBUMIN/GLOB SERPL: 1.3 (CALC) (ref 1–2.5)
ALP SERPL-CCNC: 100 U/L (ref 37–153)
ALT SERPL-CCNC: 12 U/L (ref 6–29)
AST SERPL-CCNC: 19 U/L (ref 10–35)
BILIRUB SERPL-MCNC: 0.7 MG/DL (ref 0.2–1.2)
BUN SERPL-MCNC: 13 MG/DL (ref 7–25)
BUN/CREAT SERPL: 13 (CALC) (ref 6–22)
CALCIUM SERPL-MCNC: 9.8 MG/DL (ref 8.6–10.4)
CHLORIDE SERPL-SCNC: 102 MMOL/L (ref 98–110)
CHOLEST SERPL-MCNC: 164 MG/DL
CHOLEST/HDLC SERPL: 3.6 (CALC)
CK SERPL-CCNC: 83 U/L (ref 29–143)
CO2 SERPL-SCNC: 28 MMOL/L (ref 20–32)
CREAT SERPL-MCNC: 1.02 MG/DL (ref 0.6–1)
GFR/BSA.PRED SERPLBLD CYS-BASED-ARV: 59 ML/MIN/1.73M2
GLOBULIN SER CALC-MCNC: 3.1 G/DL (CALC) (ref 1.9–3.7)
GLUCOSE SERPL-MCNC: 117 MG/DL (ref 65–99)
HDLC SERPL-MCNC: 45 MG/DL
LDLC SERPL CALC-MCNC: 91 MG/DL (CALC)
NONHDLC SERPL-MCNC: 119 MG/DL (CALC)
POTASSIUM SERPL-SCNC: 4.3 MMOL/L (ref 3.5–5.3)
PROT SERPL-MCNC: 7.2 G/DL (ref 6.1–8.1)
SODIUM SERPL-SCNC: 140 MMOL/L (ref 135–146)
TRIGL SERPL-MCNC: 186 MG/DL

## 2023-01-03 ENCOUNTER — HOSPITAL ENCOUNTER (OUTPATIENT)
Dept: CT IMAGING | Facility: HOSPITAL | Age: 73
Discharge: HOME | End: 2023-01-03

## 2023-01-03 DIAGNOSIS — R94.31 NONSPECIFIC ABNORMAL ELECTROCARDIOGRAM (ECG) (EKG): ICD-10-CM

## 2023-01-03 DIAGNOSIS — R93.3 ABNORMAL FINDINGS ON DIAGNOSTIC IMAGING OF DIGESTIVE SYSTEM: ICD-10-CM

## 2023-01-03 DIAGNOSIS — R06.02 SHORT OF BREATH ON EXERTION: ICD-10-CM

## 2023-01-03 DIAGNOSIS — R91.8 MULTIPLE LUNG NODULES ON CT: ICD-10-CM

## 2023-01-04 ENCOUNTER — HOSPITAL ENCOUNTER (OUTPATIENT)
Dept: NON INVASIVE DIAGNOSTICS | Facility: CLINIC | Age: 73
Discharge: HOME | End: 2023-01-04

## 2023-01-04 VITALS — SYSTOLIC BLOOD PRESSURE: 110 MMHG | OXYGEN SATURATION: 97 % | DIASTOLIC BLOOD PRESSURE: 70 MMHG | HEART RATE: 85 BPM

## 2023-01-04 DIAGNOSIS — G47.00 INSOMNIA, UNSPECIFIED TYPE: ICD-10-CM

## 2023-01-04 DIAGNOSIS — R94.31 NONSPECIFIC ABNORMAL ELECTROCARDIOGRAM (ECG) (EKG): ICD-10-CM

## 2023-01-04 DIAGNOSIS — R93.3 ABNORMAL FINDINGS ON DIAGNOSTIC IMAGING OF DIGESTIVE SYSTEM: ICD-10-CM

## 2023-01-04 DIAGNOSIS — R91.8 MULTIPLE LUNG NODULES ON CT: ICD-10-CM

## 2023-01-04 DIAGNOSIS — R06.02 SHORT OF BREATH ON EXERTION: ICD-10-CM

## 2023-01-04 LAB
BASELINE ST DEPRESSION: 0 MM
CHEST PAIN STATEMENT: NORMAL
MAX DIASTOLIC BP: 70 MMHG
MAX HEART RATE: 118 BPM
MAX PREDICTED HEART RATE: 149 BPM
MAX. SYSTOLIC BP: 138 MMHG
NUC STRESS EJECTION FRACTION: 77 %
PROTOCOL NAME: NORMAL
RATE PRESSURE PRODUCT: NORMAL
REASON FOR TERMINATION: NORMAL
SL CV REST NUCLEAR ISOTOPE DOSE: 10.29 MCI
SL CV STRESS NUCLEAR ISOTOPE DOSE: 32.7 MCI
SL CV STRESS RECOVERY BP: NORMAL MMHG
SL CV STRESS RECOVERY HR: 103 BPM
SL CV STRESS RECOVERY O2 SAT: 98 %
STRESS ANGINA INDEX: 0
STRESS BASELINE BP: NORMAL MMHG
STRESS BASELINE HR: 85 BPM
STRESS O2 SAT REST: 97 %
STRESS PEAK HR: 118 BPM
STRESS POST O2 SAT PEAK: 98 %
STRESS POST PEAK BP: 138 MMHG
STRESS ST DEPRESSION: 0 MM
STRESS/REST PERFUSION RATIO: 1.07
TARGET HR FORMULA: NORMAL
TEST INDICATION: NORMAL
TIME IN EXERCISE PHASE: NORMAL

## 2023-01-04 RX ORDER — TRAZODONE HYDROCHLORIDE 50 MG/1
TABLET ORAL
Qty: 30 TABLET | Refills: 0 | Status: SHIPPED | OUTPATIENT
Start: 2023-01-04

## 2023-01-04 RX ADMIN — REGADENOSON 0.4 MG: 0.08 INJECTION, SOLUTION INTRAVENOUS at 13:10

## 2023-01-23 ENCOUNTER — OFFICE VISIT (OUTPATIENT)
Dept: CARDIOLOGY CLINIC | Facility: CLINIC | Age: 73
End: 2023-01-23

## 2023-01-23 VITALS
DIASTOLIC BLOOD PRESSURE: 68 MMHG | HEIGHT: 65 IN | HEART RATE: 100 BPM | OXYGEN SATURATION: 96 % | WEIGHT: 196.6 LBS | BODY MASS INDEX: 32.76 KG/M2 | SYSTOLIC BLOOD PRESSURE: 112 MMHG

## 2023-01-23 DIAGNOSIS — E78.5 HYPERLIPIDEMIA, UNSPECIFIED HYPERLIPIDEMIA TYPE: Primary | ICD-10-CM

## 2023-01-23 DIAGNOSIS — R00.0 TACHYCARDIA: ICD-10-CM

## 2023-01-23 DIAGNOSIS — I67.9 CEREBROVASCULAR SMALL VESSEL DISEASE: Primary | ICD-10-CM

## 2023-01-23 DIAGNOSIS — R94.31 NONSPECIFIC ABNORMAL ELECTROCARDIOGRAM (ECG) (EKG): ICD-10-CM

## 2023-01-23 DIAGNOSIS — I25.10 CORONARY ARTERY CALCIFICATION SEEN ON CAT SCAN: ICD-10-CM

## 2023-01-23 DIAGNOSIS — R91.8 MULTIPLE LUNG NODULES ON CT: ICD-10-CM

## 2023-01-23 DIAGNOSIS — R73.09 ELEVATED GLUCOSE LEVEL: ICD-10-CM

## 2023-01-23 DIAGNOSIS — R00.0 TACHYCARDIA WITH HEART RATE 100-120 BEATS PER MINUTE: ICD-10-CM

## 2023-01-23 RX ORDER — METOPROLOL SUCCINATE 50 MG/1
50 TABLET, EXTENDED RELEASE ORAL DAILY
COMMUNITY
End: 2023-01-23 | Stop reason: SDUPTHER

## 2023-01-23 RX ORDER — ROSUVASTATIN CALCIUM 10 MG/1
10 TABLET, COATED ORAL DAILY
Qty: 90 TABLET | Refills: 3 | Status: SHIPPED | OUTPATIENT
Start: 2023-01-23

## 2023-01-23 RX ORDER — METOPROLOL SUCCINATE 50 MG/1
50 TABLET, EXTENDED RELEASE ORAL DAILY
Qty: 90 TABLET | Refills: 3 | Status: SHIPPED | OUTPATIENT
Start: 2023-01-23

## 2023-01-23 RX ORDER — ROSUVASTATIN CALCIUM 10 MG/1
10 TABLET, COATED ORAL DAILY
COMMUNITY
End: 2023-01-23 | Stop reason: SDUPTHER

## 2023-01-23 NOTE — PROGRESS NOTES
Follow-up - Cardiology   Michelle Hill 71 y o  female MRN: [id number]        Problems    Problem List Items Addressed This Visit    None  Visit Diagnoses     Hyperlipidemia, unspecified hyperlipidemia type    -  Primary    Multiple lung nodules on CT        Tachycardia with heart rate 100-120 beats per minute        Coronary artery calcification seen on CAT scan        Nonspecific abnormal electrocardiogram (ECG) (EKG)                Plan discussion  Patient seen January 23, 2023  Since I last saw her she had nuclear study which was read as mild ischemia  There is no mention of the angiogram as to whether there is a segmental abnormality or whether this is breast artifact  She does have a increased calcium score and she is a smoker with elevated cholesterol  She does not have a clear-cut history of angina  Nonetheless and was started on Metroprolol 50 mg daily  Him also to change her Lipitor to Crestor 10 mg daily to try to get her LDL less than 70  She still having some palpitations at least by her watch  Therefore she can have a 48-hour Holter  Also pending is the following  Endoscopy  Sleep study  CAT scan of her chest for nodule  Nuclear study was on January 4, 2023  Echo stress in November 2021 was negative          HPI: Michelle Hill is a 71y o  year old female    Plan discussion patient seen November 30, 2021     Mr  saw her complaint was primarily shortness of breath     Since that time she has had a CT scan of her chest  [facility] has small nodules is recommended that she have another CT scan 1 year     In addition, there is an abnormality consistent with a hiatal hernia and endoscopies recommended     Her pulmonary function studies are abnormal consistent with mild restrictive and mild obstructive disease     She had a stress echo   No evidence of ischemia   She is able exercise a little over 6 minutes  The plan therefore is to get a order a CT scan for 1 year     She says she will stop smoking     Also order a GI consult for endoscopy               HPI: Abi Murray is a 80y o  year old female      Reason for Consult / Principal Problem:  Short of breath and tachycardia     Patient has electrocardiogram that has variably shown left atrial or right atrial enlargement   She has a sinus rhythm   Heart rate almost 100 generally 90     She did have grave's disease and she had a the thyroid obliterated   She is on 150 micrograms of Synthroid     However, her TSH is normal     She has been treated for elevated lipids     She is on a statin     LDL is 92     Her A1c is 5 5     Remainder of her lab work is normal     She has shortness of breath with rather minimal exertion     For example running the sweeper she short of breath and has to stop   She did does not say she has any tightness in her chest     She does have family history coronary disease     Past medical history is unremarkable except for mild elevation of blood pressure high cholesterol     Her medications include Paxil as well as a statin and a diuretic for hypertension     She is      She has had at least 20 years pack years of smoking     She has a family history of high blood pressure coronary artery disease and diabetes as well as 2 sisters with dementia     Her review systems and other than her shortness of breath is unremarkable   She feels like she is significantly limited in what she can do  HPI: Abi Murray is [id number] y o  year old fem                 Plan and discussion  Patient seen October 26, 2021     We will check a Holter, echocardiogram and stress echo as well as pulmonary function studies  Also get a CT scan the she has more than a 20 pack-year history of smoking              Review of Systems   Constitutional: Negative  Respiratory: Negative  Cardiovascular: Positive for palpitations  Psychiatric/Behavioral: Negative            Past Medical History:   Diagnosis Date   • Anxiety    • Chronic kidney disease, stage III (moderate) (HCC)    • Depression    • Fluid retention    • GERD (gastroesophageal reflux disease) 30 years ago   • Graves disease    • Hypertension    • Hypertension, essential    • Hypothyroidism    • Insomnia    • Migraine    • Osteoporosis      Social History     Substance and Sexual Activity   Alcohol Use Not Currently    Comment: rare     Social History     Substance and Sexual Activity   Drug Use Never     Social History     Tobacco Use   Smoking Status Every Day   • Packs/day: 1 00   • Years: 51 00   • Pack years: 51 00   • Types: Cigarettes   • Start date: 27   Smokeless Tobacco Never   Tobacco Comments    Smokes a pack a day       Allergies:  No Known Allergies    Medications:     Current Outpatient Medications:   •  aspirin (ECOTRIN LOW STRENGTH) 81 mg EC tablet, Take 81 mg by mouth daily, Disp: , Rfl:   •  atorvastatin (LIPITOR) 40 mg tablet, Take 1 tablet (40 mg total) by mouth daily, Disp: 90 tablet, Rfl: 3  •  levothyroxine 150 mcg tablet, Take 150 mcg by mouth daily, Disp: , Rfl:   •  LORazepam (ATIVAN) 1 mg tablet, Take 1 mg by mouth every 8 (eight) hours as needed for anxiety, Disp: , Rfl:   •  PARoxetine (PAXIL) 30 mg tablet, Take 30 mg by mouth every morning, Disp: , Rfl:   •  traZODone (DESYREL) 50 mg tablet, take 1 tablet by mouth at bedtime, Disp: 30 tablet, Rfl: 0  •  triamterene-hydrochlorothiazide (MAXZIDE-25) 37 5-25 mg per tablet, Take 1 tablet by mouth daily, Disp: , Rfl: 0  •  bisacodyl (DULCOLAX) 5 mg EC tablet, Take 2 tablets (10 mg total) by mouth once for 1 dose Take once at 4pm evening before the procedure, Disp: 2 tablet, Rfl: 0  •  polyethylene glycol (GOLYTELY) 4000 mL solution, Take 4,000 mL by mouth once for 1 dose, Disp: 4000 mL, Rfl: 0      Physical Exam  Constitutional:       Appearance: She is obese  Cardiovascular:      Rate and Rhythm: Normal rate and regular rhythm  Pulses: Normal pulses  Heart sounds: No murmur heard    Pulmonary:      Effort: Pulmonary effort is normal    Musculoskeletal:      Right lower leg: No edema  Left lower leg: No edema  Skin:     General: Skin is warm and dry  Neurological:      Mental Status: She is alert and oriented to person, place, and time  Laboratory Studies:  CMP:      Invalid input(s): ALBUMIN  NT-proBNP: No results found for: NTBNP   Coags:    Lipid Profile:   No results found for: CHOL  Lab Results   Component Value Date    HDL 45 (L) 12/30/2022     Lab Results   Component Value Date    LDLCALC 91 12/30/2022     Lab Results   Component Value Date    TRIG 186 (H) 12/30/2022       Cardiac testing:     EKG reviewed personally:     No results found for this or any previous visit  No results found for this or any previous visit  No results found for this or any previous visit  No results found for this or any previous visit  Margo Rocha MD    Portions of the record may have been created with voice recognition software   Occasional wrong word or "sound a like" substitutions may have occurred due to the inherent limitations of voice recognition software   Read the chart carefully and recognize, using context, where substitutions have occurred

## 2023-02-13 DIAGNOSIS — G47.00 INSOMNIA, UNSPECIFIED TYPE: ICD-10-CM

## 2023-02-13 RX ORDER — TRAZODONE HYDROCHLORIDE 50 MG/1
TABLET ORAL
Qty: 30 TABLET | Refills: 0 | Status: SHIPPED | OUTPATIENT
Start: 2023-02-13

## 2023-02-24 ENCOUNTER — TELEPHONE (OUTPATIENT)
Dept: GASTROENTEROLOGY | Facility: AMBULARY SURGERY CENTER | Age: 73
End: 2023-02-24

## 2023-02-24 ENCOUNTER — PREP FOR PROCEDURE (OUTPATIENT)
Dept: GASTROENTEROLOGY | Facility: AMBULARY SURGERY CENTER | Age: 73
End: 2023-02-24

## 2023-02-24 DIAGNOSIS — Z12.11 SCREENING FOR COLON CANCER: Primary | ICD-10-CM

## 2023-02-24 NOTE — TELEPHONE ENCOUNTER
Scheduled date of colonoscopy (as of today): 05-   Physician performing colonoscopy: Millicent Johnson   Location of colonoscopy: [facility]  Bowel prep reviewed with patient: som rodriguez  Instructions reviewed with patient by: reviewed by Quiana/mailed  Clearances: n/a

## 2023-03-20 ENCOUNTER — PROCEDURE VISIT (OUTPATIENT)
Dept: CARDIOLOGY CLINIC | Facility: CLINIC | Age: 73
End: 2023-03-20

## 2023-03-20 DIAGNOSIS — R00.0 TACHYCARDIA: Primary | ICD-10-CM

## 2023-03-20 DIAGNOSIS — I25.10 CORONARY ARTERY CALCIFICATION SEEN ON CAT SCAN: ICD-10-CM

## 2023-03-20 DIAGNOSIS — R91.8 MULTIPLE LUNG NODULES ON CT: ICD-10-CM

## 2023-03-20 DIAGNOSIS — R00.0 TACHYCARDIA WITH HEART RATE 100-120 BEATS PER MINUTE: ICD-10-CM

## 2023-03-20 DIAGNOSIS — E78.5 HYPERLIPIDEMIA, UNSPECIFIED HYPERLIPIDEMIA TYPE: ICD-10-CM

## 2023-03-20 DIAGNOSIS — R94.31 NONSPECIFIC ABNORMAL ELECTROCARDIOGRAM (ECG) (EKG): ICD-10-CM

## 2023-03-24 LAB
ALBUMIN SERPL-MCNC: 4.1 G/DL (ref 3.6–5.1)
ALBUMIN/GLOB SERPL: 1.6 (CALC) (ref 1–2.5)
ALP SERPL-CCNC: 70 U/L (ref 37–153)
ALT SERPL-CCNC: 10 U/L (ref 6–29)
AST SERPL-CCNC: 16 U/L (ref 10–35)
BASOPHILS # BLD AUTO: 86 CELLS/UL (ref 0–200)
BASOPHILS NFR BLD AUTO: 0.9 %
BILIRUB SERPL-MCNC: 0.8 MG/DL (ref 0.2–1.2)
BUN SERPL-MCNC: 17 MG/DL (ref 7–25)
BUN/CREAT SERPL: 16 (CALC) (ref 6–22)
CALCIUM SERPL-MCNC: 9.8 MG/DL (ref 8.6–10.4)
CHLORIDE SERPL-SCNC: 102 MMOL/L (ref 98–110)
CHOLEST SERPL-MCNC: 180 MG/DL
CHOLEST/HDLC SERPL: 3 (CALC)
CK SERPL-CCNC: 97 U/L (ref 29–143)
CO2 SERPL-SCNC: 28 MMOL/L (ref 20–32)
CREAT SERPL-MCNC: 1.04 MG/DL (ref 0.6–1)
EOSINOPHIL # BLD AUTO: 250 CELLS/UL (ref 15–500)
EOSINOPHIL NFR BLD AUTO: 2.6 %
ERYTHROCYTE [DISTWIDTH] IN BLOOD BY AUTOMATED COUNT: 12.4 % (ref 11–15)
EST. AVERAGE GLUCOSE BLD GHB EST-MCNC: 120 MG/DL
EST. AVERAGE GLUCOSE BLD GHB EST-SCNC: 6.6 MMOL/L
GFR/BSA.PRED SERPLBLD CYS-BASED-ARV: 57 ML/MIN/1.73M2
GLOBULIN SER CALC-MCNC: 2.6 G/DL (CALC) (ref 1.9–3.7)
GLUCOSE SERPL-MCNC: 105 MG/DL (ref 65–99)
HBA1C MFR BLD: 5.8 % OF TOTAL HGB
HCT VFR BLD AUTO: 39.6 % (ref 35–45)
HDLC SERPL-MCNC: 61 MG/DL
HGB BLD-MCNC: 13.5 G/DL (ref 11.7–15.5)
LDLC SERPL CALC-MCNC: 94 MG/DL (CALC)
LYMPHOCYTES # BLD AUTO: 2496 CELLS/UL (ref 850–3900)
LYMPHOCYTES NFR BLD AUTO: 26 %
MCH RBC QN AUTO: 30.7 PG (ref 27–33)
MCHC RBC AUTO-ENTMCNC: 34.1 G/DL (ref 32–36)
MCV RBC AUTO: 90 FL (ref 80–100)
MONOCYTES # BLD AUTO: 806 CELLS/UL (ref 200–950)
MONOCYTES NFR BLD AUTO: 8.4 %
NEUTROPHILS # BLD AUTO: 5962 CELLS/UL (ref 1500–7800)
NEUTROPHILS NFR BLD AUTO: 62.1 %
NONHDLC SERPL-MCNC: 119 MG/DL (CALC)
PLATELET # BLD AUTO: 302 THOUSAND/UL (ref 140–400)
PMV BLD REES-ECKER: 10.7 FL (ref 7.5–12.5)
POTASSIUM SERPL-SCNC: 4.2 MMOL/L (ref 3.5–5.3)
PROT SERPL-MCNC: 6.7 G/DL (ref 6.1–8.1)
RBC # BLD AUTO: 4.4 MILLION/UL (ref 3.8–5.1)
SODIUM SERPL-SCNC: 140 MMOL/L (ref 135–146)
TRIGL SERPL-MCNC: 150 MG/DL
WBC # BLD AUTO: 9.6 THOUSAND/UL (ref 3.8–10.8)

## 2023-03-27 ENCOUNTER — OFFICE VISIT (OUTPATIENT)
Dept: CARDIOLOGY CLINIC | Facility: CLINIC | Age: 73
End: 2023-03-27

## 2023-03-27 VITALS
HEIGHT: 65 IN | SYSTOLIC BLOOD PRESSURE: 120 MMHG | BODY MASS INDEX: 34.26 KG/M2 | HEART RATE: 68 BPM | OXYGEN SATURATION: 97 % | DIASTOLIC BLOOD PRESSURE: 68 MMHG | WEIGHT: 205.6 LBS

## 2023-03-27 DIAGNOSIS — R93.3 ABNORMAL FINDINGS ON DIAGNOSTIC IMAGING OF DIGESTIVE SYSTEM: ICD-10-CM

## 2023-03-27 DIAGNOSIS — R91.8 MULTIPLE LUNG NODULES ON CT: ICD-10-CM

## 2023-03-27 DIAGNOSIS — R00.0 TACHYCARDIA WITH HEART RATE 100-120 BEATS PER MINUTE: ICD-10-CM

## 2023-03-27 DIAGNOSIS — E78.5 HYPERLIPIDEMIA, UNSPECIFIED HYPERLIPIDEMIA TYPE: Primary | ICD-10-CM

## 2023-03-27 DIAGNOSIS — R73.09 ABNORMAL GLUCOSE: ICD-10-CM

## 2023-03-27 DIAGNOSIS — I25.10 CORONARY ARTERY CALCIFICATION SEEN ON CAT SCAN: ICD-10-CM

## 2023-03-27 NOTE — PROGRESS NOTES
Follow-up - Cardiology   Genoveva Keen 71 y o  female MRN: [id number]        Problems    Problem List Items Addressed This Visit    None  Visit Diagnoses     Hyperlipidemia, unspecified hyperlipidemia type    -  Primary    Abnormal findings on diagnostic imaging of digestive system        Multiple lung nodules on CT        Coronary artery calcification seen on CAT scan        Tachycardia with heart rate 100-120 beats per minute                Plan discussion  Patient has the following issues  She had a tachycardia with a lot of anxiety after her loss of her sisters  Holter counter result is pending and we will call her with the result  Hypertension is being treated  She has heavy calcification of her coronaries  She is on Crestor 10 mg  LDL is 94  Crestor be increased to 10 alternating with 20  We will recheck over lipid profile in 2 months  She is an ex-smoker  She has multiple nodules on echocardiogram November 2022  CAT scan be repeated early 2024  Cardiac nuclear study January 2023 was negative  Echo stress was negative in 2021  Endoscopy is still pending  Recheck A1c          HPI: Genoveva Keen is a 71y o  year old female               Plan discussion  Patient seen January 23, 2023  Since I last saw her she had nuclear study which was read as mild ischemia  There is no mention of the angiogram as to whether there is a segmental abnormality or whether this is breast artifact  She does have a increased calcium score and she is a smoker with elevated cholesterol  She does not have a clear-cut history of angina  Nonetheless and was started on Metroprolol 50 mg daily  Him also to change her Lipitor to Crestor 10 mg daily to try to get her LDL less than 70  She still having some palpitations at least by her watch  Therefore she can have a 48-hour Holter  Also pending is the following  Endoscopy  Sleep study  CAT scan of her chest for nodule    Nuclear study was on January 4, 2023  Echo stress in November 2021 was negative              HPI: Jeremy Love is a 71y o  year old female    Plan discussion patient seen November 30, 2021     Mr  saw her complaint was primarily shortness of breath     Since that time she has had a CT scan of her chest  [facility] has small nodules is recommended that she have another CT scan 1 year     In addition, there is an abnormality consistent with a hiatal hernia and endoscopies recommended     Her pulmonary function studies are abnormal consistent with mild restrictive and mild obstructive disease     She had a stress echo   No evidence of ischemia   She is able exercise a little over 6 minutes    The plan therefore is to get a order a CT scan for 1 year     She says she will stop smoking     Also order a GI consult for endoscopy               HPI: Abi Murray is a 80y o  year old female      Reason for Consult / Principal Problem:  Short of breath and tachycardia     Patient has electrocardiogram that has variably shown left atrial or right atrial enlargement   She has a sinus rhythm   Heart rate almost 100 generally 90     She did have grave's disease and she had a the thyroid obliterated   She is on 150 micrograms of Synthroid     However, her TSH is normal     She has been treated for elevated lipids     She is on a statin     LDL is 92     Her A1c is 5 5     Remainder of her lab work is normal     She has shortness of breath with rather minimal exertion     For example running the sweeper she short of breath and has to stop   She did does not say she has any tightness in her chest     She does have family history coronary disease     Past medical history is unremarkable except for mild elevation of blood pressure high cholesterol     Her medications include Paxil as well as a statin and a diuretic for hypertension     She is      She has had at least 20 years pack years of smoking     She has a family history of high blood pressure coronary artery disease and diabetes as well as 2 sisters with dementia     Her review systems and other than her shortness of breath is unremarkable   She feels like she is significantly limited in what she can do  HPI: Abi Murray is [id number] y o  year old fem                 Plan and discussion  Patient seen October 26, 2021     We will check a Holter, echocardiogram and stress echo as well as pulmonary function studies  Also get a CT scan the she has more than a 20 pack-year history of smoking             Review of Systems   Constitutional: Negative  Respiratory: Negative  Cardiovascular: Negative            Past Medical History:   Diagnosis Date   • Anxiety    • Chronic kidney disease, stage III (moderate) (HCC)    • Depression    • Fluid retention    • GERD (gastroesophageal reflux disease) 30 years ago   • Graves disease    • Hypertension    • Hypertension, essential    • Hypothyroidism    • Insomnia    • Migraine    • Osteoporosis      Social History     Substance and Sexual Activity   Alcohol Use Not Currently    Comment: rare     Social History     Substance and Sexual Activity   Drug Use Never     Social History     Tobacco Use   Smoking Status Former   • Packs/day: 1 00   • Years: 51 00   • Pack years: 51 00   • Types: Cigarettes   • Start date: 1971   Smokeless Tobacco Never   Tobacco Comments    Smokes a pack a day       Allergies:  No Known Allergies    Medications:     Current Outpatient Medications:   •  aspirin (ECOTRIN LOW STRENGTH) 81 mg EC tablet, Take 81 mg by mouth daily, Disp: , Rfl:   •  bisacodyl (DULCOLAX) 5 mg EC tablet, Take 2 tablets (10 mg total) by mouth once for 1 dose Take once at 4pm evening before the procedure, Disp: 2 tablet, Rfl: 0  •  levothyroxine 150 mcg tablet, Take 150 mcg by mouth daily, Disp: , Rfl:   •  LORazepam (ATIVAN) 1 mg tablet, Take 1 mg by mouth every 8 (eight) hours as needed for anxiety, Disp: , Rfl:   •  metoprolol succinate (TOPROL-XL) 50 mg 24 hr tablet, Take 1 tablet (50 mg total) by mouth daily, Disp: 90 tablet, Rfl: 3  •  PARoxetine (PAXIL) 30 mg tablet, Take 30 mg by mouth every morning, Disp: , Rfl:   •  polyethylene glycol (GOLYTELY) 4000 mL solution, Take 4,000 mL by mouth once for 1 dose, Disp: 4000 mL, Rfl: 0  •  rosuvastatin (CRESTOR) 10 MG tablet, Take 1 tablet (10 mg total) by mouth daily, Disp: 90 tablet, Rfl: 3  •  traZODone (DESYREL) 50 mg tablet, take 1 tablet by mouth at bedtime, Disp: 30 tablet, Rfl: 0  •  triamterene-hydrochlorothiazide (MAXZIDE-25) 37 5-25 mg per tablet, Take 1 tablet by mouth daily, Disp: , Rfl: 0      Physical Exam  Constitutional:       Appearance: She is obese  Cardiovascular:      Rate and Rhythm: Normal rate and regular rhythm  Heart sounds: No murmur heard  Pulmonary:      Effort: Pulmonary effort is normal    Musculoskeletal:      Right lower leg: No edema  Left lower leg: No edema  Skin:     General: Skin is warm and dry  Neurological:      Mental Status: She is alert  Psychiatric:         Behavior: Behavior normal            Laboratory Studies:  CMP:  Results from last 7 days   Lab Units 03/23/23  0900   POTASSIUM mmol/L 4 2   CHLORIDE mmol/L 102   CO2 mmol/L 28   BUN mg/dL 17   CREATININE mg/dL 1 04*   CALCIUM mg/dL 9 8   AST U/L 16   ALT U/L 10   ALK PHOS U/L 70   EGFR mL/min/1 73m2 57*     NT-proBNP: No results found for: NTBNP   Coags:    Lipid Profile:   No results found for: CHOL  Lab Results   Component Value Date    HDL 61 03/23/2023     Lab Results   Component Value Date    LDLCALC 94 03/23/2023     Lab Results   Component Value Date    TRIG 150 (H) 03/23/2023       Cardiac testing:     EKG reviewed personally:     No results found for this or any previous visit  No results found for this or any previous visit  No results found for this or any previous visit  No results found for this or any previous visit        Jayant Shaw MD    Portions of the record may have been created with voice recognition software   "Occasional wrong word or \"sound a like\" substitutions may have occurred due to the inherent limitations of voice recognition software   Read the chart carefully and recognize, using context, where substitutions have occurred    "

## 2023-03-28 ENCOUNTER — HOSPITAL ENCOUNTER (OUTPATIENT)
Dept: NON INVASIVE DIAGNOSTICS | Facility: HOSPITAL | Age: 73
Discharge: HOME | End: 2023-03-28

## 2023-03-28 DIAGNOSIS — R91.8 MULTIPLE LUNG NODULES ON CT: ICD-10-CM

## 2023-03-28 DIAGNOSIS — E78.5 HYPERLIPIDEMIA, UNSPECIFIED HYPERLIPIDEMIA TYPE: ICD-10-CM

## 2023-03-28 DIAGNOSIS — R00.0 TACHYCARDIA WITH HEART RATE 100-120 BEATS PER MINUTE: ICD-10-CM

## 2023-03-28 DIAGNOSIS — I25.10 CORONARY ARTERY CALCIFICATION SEEN ON CAT SCAN: ICD-10-CM

## 2023-03-28 DIAGNOSIS — R94.31 NONSPECIFIC ABNORMAL ELECTROCARDIOGRAM (ECG) (EKG): ICD-10-CM

## 2023-03-31 ENCOUNTER — TELEPHONE (OUTPATIENT)
Dept: PULMONOLOGY | Facility: CLINIC | Age: 73
End: 2023-03-31

## 2023-03-31 ENCOUNTER — TELEPHONE (OUTPATIENT)
Dept: CARDIOLOGY CLINIC | Facility: CLINIC | Age: 73
End: 2023-03-31

## 2023-03-31 RX ORDER — ROSUVASTATIN CALCIUM 10 MG/1
TABLET, COATED ORAL
COMMUNITY
End: 2023-04-03 | Stop reason: SDUPTHER

## 2023-03-31 NOTE — TELEPHONE ENCOUNTER
Elizabeth, pt is asking for a c/b-she said you where calling in a script for her-she went to pick it up and its not there      Thanks

## 2023-04-03 DIAGNOSIS — I25.10 CORONARY ARTERY CALCIFICATION SEEN ON CAT SCAN: ICD-10-CM

## 2023-04-03 DIAGNOSIS — E78.5 HYPERLIPIDEMIA, UNSPECIFIED HYPERLIPIDEMIA TYPE: Primary | ICD-10-CM

## 2023-04-03 RX ORDER — ROSUVASTATIN CALCIUM 10 MG/1
TABLET, COATED ORAL
Qty: 135 TABLET | Refills: 3 | Status: SHIPPED | OUTPATIENT
Start: 2023-04-03

## 2023-04-19 ENCOUNTER — DOCUMENTATION (OUTPATIENT)
Dept: GASTROENTEROLOGY | Facility: CLINIC | Age: 73
End: 2023-04-19

## 2023-04-19 NOTE — PROGRESS NOTES
Called patient to remind of procedure, mail box full  Called home phone and no answer or voice mail    Sent letter to confirm appointment

## 2023-06-20 ENCOUNTER — TELEPHONE (OUTPATIENT)
Dept: CARDIOLOGY CLINIC | Facility: CLINIC | Age: 73
End: 2023-06-20

## 2023-06-20 NOTE — TELEPHONE ENCOUNTER
Good morning  This is [facility]  [phone]  And yes, I have some questions that regarding my medication and if you could give me a call back that would be wonderful  Again, this is [employer]  My birthday is [birth year] and my phone number [phone]  Thanks so much  Take care   Bye bye    callled and left a message to call office

## 2023-10-17 ENCOUNTER — TELEPHONE (OUTPATIENT)
Dept: PSYCHIATRY | Facility: CLINIC | Age: 73
End: 2023-10-17

## 2023-11-21 LAB
BASOPHILS # BLD AUTO: 72 CELLS/UL (ref 0–200)
BASOPHILS NFR BLD AUTO: 0.6 %
CHOLEST SERPL-MCNC: 167 MG/DL
CHOLEST/HDLC SERPL: 3 (CALC)
EOSINOPHIL # BLD AUTO: 132 CELLS/UL (ref 15–500)
EOSINOPHIL NFR BLD AUTO: 1.1 %
ERYTHROCYTE [DISTWIDTH] IN BLOOD BY AUTOMATED COUNT: 12.4 % (ref 11–15)
HCT VFR BLD AUTO: 43.3 % (ref 35–45)
HDLC SERPL-MCNC: 55 MG/DL
HGB BLD-MCNC: 15 G/DL (ref 11.7–15.5)
LDLC SERPL CALC-MCNC: 83 MG/DL (CALC)
LYMPHOCYTES # BLD AUTO: 2016 CELLS/UL (ref 850–3900)
LYMPHOCYTES NFR BLD AUTO: 16.8 %
MCH RBC QN AUTO: 31.1 PG (ref 27–33)
MCHC RBC AUTO-ENTMCNC: 34.6 G/DL (ref 32–36)
MCV RBC AUTO: 89.8 FL (ref 80–100)
MONOCYTES # BLD AUTO: 816 CELLS/UL (ref 200–950)
MONOCYTES NFR BLD AUTO: 6.8 %
NEUTROPHILS # BLD AUTO: 8964 CELLS/UL (ref 1500–7800)
NEUTROPHILS NFR BLD AUTO: 74.7 %
NONHDLC SERPL-MCNC: 112 MG/DL (CALC)
PLATELET # BLD AUTO: 388 THOUSAND/UL (ref 140–400)
PMV BLD REES-ECKER: 10.6 FL (ref 7.5–12.5)
RBC # BLD AUTO: 4.82 MILLION/UL (ref 3.8–5.1)
TRIGL SERPL-MCNC: 191 MG/DL
WBC # BLD AUTO: 12 THOUSAND/UL (ref 3.8–10.8)

## 2023-11-22 ENCOUNTER — HOSPITAL ENCOUNTER (EMERGENCY)
Facility: HOSPITAL | Age: 73
Discharge: HOME | End: 2023-11-23
Attending: EMERGENCY MEDICINE
Payer: MEDICARE

## 2023-11-22 DIAGNOSIS — F41.9 ANXIETY: ICD-10-CM

## 2023-11-22 DIAGNOSIS — R07.9 CHEST PAIN: Primary | ICD-10-CM

## 2023-11-22 DIAGNOSIS — K21.9 GERD (GASTROESOPHAGEAL REFLUX DISEASE): ICD-10-CM

## 2023-11-22 PROCEDURE — 99285 EMERGENCY DEPT VISIT HI MDM: CPT

## 2023-11-23 ENCOUNTER — APPOINTMENT (EMERGENCY)
Dept: RADIOLOGY | Facility: HOSPITAL | Age: 73
End: 2023-11-23
Payer: MEDICARE

## 2023-11-23 VITALS
SYSTOLIC BLOOD PRESSURE: 127 MMHG | HEART RATE: 71 BPM | OXYGEN SATURATION: 95 % | RESPIRATION RATE: 18 BRPM | DIASTOLIC BLOOD PRESSURE: 65 MMHG | TEMPERATURE: 98.4 F

## 2023-11-23 LAB
2HR DELTA HS TROPONIN: 0 NG/L
ALBUMIN SERPL BCP-MCNC: 4 G/DL (ref 3.5–5)
ALP SERPL-CCNC: 77 U/L (ref 34–104)
ALT SERPL W P-5'-P-CCNC: 12 U/L (ref 7–52)
ANION GAP SERPL CALCULATED.3IONS-SCNC: 11 MMOL/L
AST SERPL W P-5'-P-CCNC: 20 U/L (ref 13–39)
ATRIAL RATE: 75 BPM
BASOPHILS # BLD AUTO: 0.1 THOUSANDS/ÂΜL (ref 0–0.1)
BASOPHILS NFR BLD AUTO: 1 % (ref 0–1)
BILIRUB SERPL-MCNC: 0.45 MG/DL (ref 0.2–1)
BUN SERPL-MCNC: 19 MG/DL (ref 5–25)
CALCIUM SERPL-MCNC: 9.2 MG/DL (ref 8.4–10.2)
CARDIAC TROPONIN I PNL SERPL HS: 7 NG/L
CARDIAC TROPONIN I PNL SERPL HS: 7 NG/L
CHLORIDE SERPL-SCNC: 101 MMOL/L (ref 96–108)
CO2 SERPL-SCNC: 27 MMOL/L (ref 21–32)
CREAT SERPL-MCNC: 1.03 MG/DL (ref 0.6–1.3)
EOSINOPHIL # BLD AUTO: 0.23 THOUSAND/ÂΜL (ref 0–0.61)
EOSINOPHIL NFR BLD AUTO: 2 % (ref 0–6)
ERYTHROCYTE [DISTWIDTH] IN BLOOD BY AUTOMATED COUNT: 12.6 % (ref 11.6–15.1)
GFR SERPL CREATININE-BSD FRML MDRD: 54 ML/MIN/1.73SQ M
GLUCOSE SERPL-MCNC: 102 MG/DL (ref 65–140)
HCT VFR BLD AUTO: 41.4 % (ref 34.8–46.1)
HGB BLD-MCNC: 14.2 G/DL (ref 11.5–15.4)
IMM GRANULOCYTES # BLD AUTO: 0.02 THOUSAND/UL (ref 0–0.2)
IMM GRANULOCYTES NFR BLD AUTO: 0 % (ref 0–2)
LYMPHOCYTES # BLD AUTO: 3.92 THOUSANDS/ÂΜL (ref 0.6–4.47)
LYMPHOCYTES NFR BLD AUTO: 35 % (ref 14–44)
MCH RBC QN AUTO: 31.2 PG (ref 26.8–34.3)
MCHC RBC AUTO-ENTMCNC: 34.3 G/DL (ref 31.4–37.4)
MCV RBC AUTO: 91 FL (ref 82–98)
MONOCYTES # BLD AUTO: 1.19 THOUSAND/ÂΜL (ref 0.17–1.22)
MONOCYTES NFR BLD AUTO: 11 % (ref 4–12)
NEUTROPHILS # BLD AUTO: 5.63 THOUSANDS/ÂΜL (ref 1.85–7.62)
NEUTS SEG NFR BLD AUTO: 51 % (ref 43–75)
NRBC BLD AUTO-RTO: 0 /100 WBCS
P AXIS: 54 DEGREES
PLATELET # BLD AUTO: 366 THOUSANDS/UL (ref 149–390)
PMV BLD AUTO: 10.2 FL (ref 8.9–12.7)
POTASSIUM SERPL-SCNC: 2.9 MMOL/L (ref 3.5–5.3)
PR INTERVAL: 152 MS
PROT SERPL-MCNC: 6.8 G/DL (ref 6.4–8.4)
QRS AXIS: 61 DEGREES
QRSD INTERVAL: 72 MS
QT INTERVAL: 412 MS
QTC INTERVAL: 460 MS
RBC # BLD AUTO: 4.55 MILLION/UL (ref 3.81–5.12)
SODIUM SERPL-SCNC: 139 MMOL/L (ref 135–147)
T WAVE AXIS: 44 DEGREES
VENTRICULAR RATE: 75 BPM
WBC # BLD AUTO: 11.09 THOUSAND/UL (ref 4.31–10.16)

## 2023-11-23 PROCEDURE — 96374 THER/PROPH/DIAG INJ IV PUSH: CPT

## 2023-11-23 PROCEDURE — 99285 EMERGENCY DEPT VISIT HI MDM: CPT

## 2023-11-23 PROCEDURE — 85025 COMPLETE CBC W/AUTO DIFF WBC: CPT

## 2023-11-23 PROCEDURE — 71046 X-RAY EXAM CHEST 2 VIEWS: CPT

## 2023-11-23 PROCEDURE — 80053 COMPREHEN METABOLIC PANEL: CPT

## 2023-11-23 PROCEDURE — 84484 ASSAY OF TROPONIN QUANT: CPT

## 2023-11-23 PROCEDURE — 93005 ELECTROCARDIOGRAM TRACING: CPT

## 2023-11-23 PROCEDURE — 36415 COLL VENOUS BLD VENIPUNCTURE: CPT

## 2023-11-23 PROCEDURE — 93010 ELECTROCARDIOGRAM REPORT: CPT | Performed by: INTERNAL MEDICINE

## 2023-11-23 RX ORDER — POTASSIUM CHLORIDE 20 MEQ/1
40 TABLET, EXTENDED RELEASE ORAL ONCE
Status: COMPLETED | OUTPATIENT
Start: 2023-11-23 | End: 2023-11-23

## 2023-11-23 RX ORDER — FAMOTIDINE 20 MG/1
20 TABLET, FILM COATED ORAL 2 TIMES DAILY
Qty: 28 TABLET | Refills: 0 | Status: SHIPPED | OUTPATIENT
Start: 2023-11-23 | End: 2023-12-07

## 2023-11-23 RX ORDER — FAMOTIDINE 10 MG/ML
20 INJECTION, SOLUTION INTRAVENOUS ONCE
Status: COMPLETED | OUTPATIENT
Start: 2023-11-23 | End: 2023-11-23

## 2023-11-23 RX ORDER — LIDOCAINE HYDROCHLORIDE 20 MG/ML
15 SOLUTION OROPHARYNGEAL ONCE
Status: COMPLETED | OUTPATIENT
Start: 2023-11-23 | End: 2023-11-23

## 2023-11-23 RX ORDER — HYDROXYZINE HYDROCHLORIDE 25 MG/1
25 TABLET, FILM COATED ORAL ONCE
Status: COMPLETED | OUTPATIENT
Start: 2023-11-23 | End: 2023-11-23

## 2023-11-23 RX ORDER — MAGNESIUM HYDROXIDE/ALUMINUM HYDROXICE/SIMETHICONE 120; 1200; 1200 MG/30ML; MG/30ML; MG/30ML
30 SUSPENSION ORAL ONCE
Status: COMPLETED | OUTPATIENT
Start: 2023-11-23 | End: 2023-11-23

## 2023-11-23 RX ORDER — SUCRALFATE 1 G/1
1 TABLET ORAL 3 TIMES DAILY PRN
Qty: 21 TABLET | Refills: 0 | Status: SHIPPED | OUTPATIENT
Start: 2023-11-23 | End: 2023-11-30

## 2023-11-23 RX ADMIN — HYDROXYZINE HYDROCHLORIDE 25 MG: 25 TABLET ORAL at 00:47

## 2023-11-23 RX ADMIN — LIDOCAINE HYDROCHLORIDE 15 ML: 20 SOLUTION ORAL; TOPICAL at 00:48

## 2023-11-23 RX ADMIN — POTASSIUM CHLORIDE 40 MEQ: 1500 TABLET, EXTENDED RELEASE ORAL at 02:21

## 2023-11-23 RX ADMIN — FAMOTIDINE 20 MG: 10 INJECTION INTRAVENOUS at 00:47

## 2023-11-23 RX ADMIN — ALUMINUM HYDROXIDE, MAGNESIUM HYDROXIDE, AND DIMETHICONE 30 ML: 200; 20; 200 SUSPENSION ORAL at 00:48

## 2023-11-23 NOTE — ED PROVIDER NOTES
History  Chief Complaint   Patient presents with    Chest Pain     Patient was in this ED visiting another patient. This RN walked into room and noticed patient looking unwell. This RN inquired and patient stated she has a cardiac hx and is having L side chest pain. Patient is a 61-year-old female with PMH of HTN, HLD, prediabetes, Graves' disease, CKD stage III, anxiety, and depression presenting for evaluation of chest pain. The patient received a call from a family member that her sister was vomiting blood. At that time, approximately 10 PM, she started having midsternal and under left breast chest pain. Patient reports having episodes of chest pain like this in the past related to anxiety and stress. She also notes over the last week having increased episodes of chest pain at night while lying flat. She has a history of sliding hiatal hernia which she attributes also to her intermittent episodes of chest pain. She was visiting her sister tonight in the ER room when she continued to feel the pain for which she presents for further evaluation. She denies associated lightheadedness or dizziness, shortness of breath, palpitations, diaphoresis, and N/V. She follows with Dr. Abbie Marcum from cardiology and notes that she has a follow-up appointment with them this coming Friday (in 2 days) for transition of care as her primary cardiologist just retired. She denies recent illnesses, fevers, chills. History provided by:  Patient and relative (Patient's niece)   used: Yes    Chest Pain  Associated symptoms: no abdominal pain, no back pain, no cough, no dizziness, no fever, no headache, no nausea, no numbness, no palpitations, no shortness of breath, not vomiting and no weakness        Prior to Admission Medications   Prescriptions Last Dose Informant Patient Reported? Taking?    LORazepam (ATIVAN) 1 mg tablet  Self Yes No   Sig: Take 1 mg by mouth every 8 (eight) hours as needed for anxiety   PARoxetine (PAXIL) 30 mg tablet  Self Yes No   Sig: Take 30 mg by mouth every morning   aspirin (ECOTRIN LOW STRENGTH) 81 mg EC tablet  Self Yes No   Sig: Take 81 mg by mouth daily   bisacodyl (DULCOLAX) 5 mg EC tablet   No No   Sig: Take 2 tablets (10 mg total) by mouth once for 1 dose Take once at 4pm evening before the procedure   levothyroxine 150 mcg tablet  Self Yes No   Sig: Take 150 mcg by mouth daily   metoprolol succinate (TOPROL-XL) 50 mg 24 hr tablet   No No   Sig: Take 1 tablet (50 mg total) by mouth daily   polyethylene glycol (GOLYTELY) 4000 mL solution   No No   Sig: Take 4,000 mL by mouth once for 1 dose   rosuvastatin (CRESTOR) 10 MG tablet   No No   Sig: TAKE 1 TABLET ALTERNATING WITH 2 TABLETS EVERY OTHER DAY   traZODone (DESYREL) 50 mg tablet   No No   Sig: take 1 tablet by mouth at bedtime   triamterene-hydrochlorothiazide (MAXZIDE-25) 37.5-25 mg per tablet  Self Yes No   Sig: Take 1 tablet by mouth daily      Facility-Administered Medications: None       Past Medical History:   Diagnosis Date    Anxiety     Chronic kidney disease, stage III (moderate) (HCC)     Depression     Elevated hemoglobin A1c     Fluid retention     GERD (gastroesophageal reflux disease) 30 years ago    Graves disease     Hyperlipidemia     Hypertension     Hypertension, essential     Hypothyroidism     Insomnia     Migraine     Osteoporosis     Prediabetes        Past Surgical History:   Procedure Laterality Date    THYROIDECTOMY         Family History   Problem Relation Age of Onset    Diabetes Mother     Parkinsonism Father     Diabetes Sister     Breast cancer Sister         bi-lateral masectomy     Dementia Sister         frontoemporal demantia, age 61     Diabetes Brother      I have reviewed and agree with the history as documented.     E-Cigarette/Vaping    E-Cigarette Use Never User      E-Cigarette/Vaping Substances    Nicotine No     THC No     CBD No     Flavoring No     Other No     Unknown No Social History     Tobacco Use    Smoking status: Former     Packs/day: 1.00     Years: 51.00     Total pack years: 51.00     Types: Cigarettes     Start date: 1971    Smokeless tobacco: Never    Tobacco comments:     Smokes a pack a day   Vaping Use    Vaping Use: Never used   Substance Use Topics    Alcohol use: Not Currently     Comment: rare    Drug use: Never       Review of Systems   Constitutional:  Negative for chills and fever. Respiratory:  Negative for cough, shortness of breath, wheezing and stridor. Cardiovascular:  Positive for chest pain. Negative for palpitations and leg swelling. Gastrointestinal:  Negative for abdominal pain, nausea and vomiting. Musculoskeletal:  Negative for back pain and gait problem. Skin:  Negative for color change, pallor, rash and wound. Allergic/Immunologic: Negative for immunocompromised state. Neurological:  Negative for dizziness, syncope, weakness, light-headedness, numbness and headaches. All other systems reviewed and are negative. Physical Exam  Physical Exam  Vitals and nursing note reviewed. Constitutional:       General: She is not in acute distress. Appearance: Normal appearance. She is not ill-appearing, toxic-appearing or diaphoretic. HENT:      Head: Normocephalic and atraumatic. Jaw: There is normal jaw occlusion. Nose: Nose normal.      Mouth/Throat:      Lips: Pink. Mouth: Mucous membranes are moist.   Eyes:      Extraocular Movements: Extraocular movements intact. Conjunctiva/sclera: Conjunctivae normal.   Cardiovascular:      Rate and Rhythm: Normal rate and regular rhythm. Pulses:           Radial pulses are 2+ on the right side and 2+ on the left side. Dorsalis pedis pulses are 2+ on the right side and 2+ on the left side. Heart sounds: Normal heart sounds, S1 normal and S2 normal. No murmur heard.   Pulmonary:      Effort: Pulmonary effort is normal. No tachypnea or respiratory distress. Breath sounds: Normal breath sounds and air entry. No stridor, decreased air movement or transmitted upper airway sounds. No decreased breath sounds. Musculoskeletal:         General: Normal range of motion. Cervical back: Neck supple. Right lower leg: No edema. Left lower leg: No edema. Skin:     General: Skin is warm and dry. Capillary Refill: Capillary refill takes less than 2 seconds. Findings: No rash or wound. Neurological:      General: No focal deficit present. Mental Status: She is alert and oriented to person, place, and time. Mental status is at baseline.          Vital Signs  ED Triage Vitals   Temperature Pulse Respirations Blood Pressure SpO2   11/23/23 0258 11/22/23 2358 11/22/23 2358 11/22/23 2358 11/22/23 2358   98.4 °F (36.9 °C) 76 18 133/66 97 %      Temp Source Heart Rate Source Patient Position - Orthostatic VS BP Location FiO2 (%)   11/23/23 0258 11/23/23 0230 11/22/23 2358 11/22/23 2358 --   Oral Monitor Lying Right arm       Pain Score       --                  Vitals:    11/23/23 0100 11/23/23 0130 11/23/23 0200 11/23/23 0230   BP: 112/67 119/62 131/55 127/65   Pulse: 74 71 69 71   Patient Position - Orthostatic VS:             Visual Acuity      ED Medications  Medications   hydrOXYzine HCL (ATARAX) tablet 25 mg (25 mg Oral Given 11/23/23 0047)   aluminum-magnesium hydroxide-simethicone (MAALOX) oral suspension 30 mL (30 mL Oral Given 11/23/23 0048)   Lidocaine Viscous HCl (XYLOCAINE) 2 % mucosal solution 15 mL (15 mL Swish & Spit Given 11/23/23 0048)   Famotidine (PF) (PEPCID) injection 20 mg (20 mg Intravenous Given 11/23/23 0047)   potassium chloride (K-DUR,KLOR-CON) CR tablet 40 mEq (40 mEq Oral Given 11/23/23 0221)       Diagnostic Studies  Results Reviewed       Procedure Component Value Units Date/Time    HS Troponin I 2hr [[id number]]  (Normal) Collected: 11/23/23 0218    Lab Status: Final result Specimen: Blood from Arm, Right Updated: 11/23/23 0249     hs TnI 2hr 7 ng/L      Delta 2hr hsTnI 0 ng/L     HS Troponin 0hr (reflex protocol) [[id number]]  (Normal) Collected: 11/23/23 0020    Lab Status: Final result Specimen: Blood from Arm, Right Updated: 11/23/23 0120     hs TnI 0hr 7 ng/L     Comprehensive metabolic panel [[id number]]  (Abnormal) Collected: 11/23/23 0020    Lab Status: Final result Specimen: Blood from Arm, Right Updated: 11/23/23 0118     Sodium 139 mmol/L      Potassium 2.9 mmol/L      Chloride 101 mmol/L      CO2 27 mmol/L      ANION GAP 11 mmol/L      BUN 19 mg/dL      Creatinine 1.03 mg/dL      Glucose 102 mg/dL      Calcium 9.2 mg/dL      AST 20 U/L      ALT 12 U/L      Alkaline Phosphatase 77 U/L      Total Protein 6.8 g/dL      Albumin 4.0 g/dL      Total Bilirubin 0.45 mg/dL      eGFR 54 ml/min/1.73sq m     Narrative:      [place] guidelines for Chronic Kidney Disease (CKD):     Stage 1 with normal or high GFR (GFR > 90 mL/min/1.73 square meters)    Stage 2 Mild CKD (GFR = 60-89 mL/min/1.73 square meters)    Stage 3A Moderate CKD (GFR = 45-59 mL/min/1.73 square meters)    Stage 3B Moderate CKD (GFR = 30-44 mL/min/1.73 square meters)    Stage 4 Severe CKD (GFR = 15-29 mL/min/1.73 square meters)    Stage 5 End Stage CKD (GFR <15 mL/min/1.73 square meters)  Note: GFR calculation is accurate only with a steady state creatinine    CBC and differential [[id number]]  (Abnormal) Collected: 11/23/23 0020    Lab Status: Final result Specimen: Blood from Arm, Right Updated: 11/23/23 0046     WBC 11.09 Thousand/uL      RBC 4.55 Million/uL      Hemoglobin 14.2 g/dL      Hematocrit 41.4 %      MCV 91 fL      MCH 31.2 pg      MCHC 34.3 g/dL      RDW 12.6 %      MPV 10.2 fL      Platelets 225 Thousands/uL      nRBC 0 /100 WBCs      Neutrophils Relative 51 %      Immat GRANS % 0 %      Lymphocytes Relative 35 %      Monocytes Relative 11 %      Eosinophils Relative 2 %      Basophils Relative 1 % Neutrophils Absolute 5.63 Thousands/µL      Immature Grans Absolute 0.02 Thousand/uL      Lymphocytes Absolute 3.92 Thousands/µL      Monocytes Absolute 1.19 Thousand/µL      Eosinophils Absolute 0.23 Thousand/µL      Basophils Absolute 0.10 Thousands/µL                    XR chest 2 views   ED Interpretation by Deonte Soto (11/23 0152)   No acute cardiopulmonary disease identified by me. Procedures  ECG 12 Lead Documentation Only    Date/Time: 11/23/2023 12:15 AM    Performed by: Guillermo Andres, [address] [place] [address]  Authorized by: ALISSA Boyce    Indications / Diagnosis:  Chest pain  ECG reviewed by me, the ED Provider: yes    Patient location:  ED  Previous ECG:     Previous ECG:  Compared to current    Comparison ECG info:  April 24, 2016    Similarity:  No change    Comparison to cardiac monitor: Yes    Interpretation:     Interpretation: non-specific    Rate:     ECG rate:  75    ECG rate assessment: normal    Rhythm:     Rhythm: sinus rhythm    Ectopy:     Ectopy: none    QRS:     QRS axis:  Normal    QRS intervals:  Normal  Conduction:     Conduction: normal    ST segments:     ST segments:  Non-specific  T waves:     T waves: non-specific    Comments:      Sinus rhythm, normal axis, normal intervals, no acute ischemic changes read by me           ED Course  ED Course as of 11/23/23 0644   Thu Nov 23, 2023   0044 Patient received disturbing and upsetting phone call this evening and soon after developed chest pain. She notes she is lost 3 sisters in the last 1.5 years and she is concerned that her sister that is in the ER now will also pass. She is acutely anxious on exam.  She request Ativan by name. Head to physical exam is benign. Plan made for EKG, CXR, and labs. Patient does have a history of HTN for which she is managed with metoprolol. Suspicion at this time is highest for anxiety and/or GERD for the source the patient's pain. Will medicate for both.   Suspicion overall low at this time for underlying cardiac pathology, suspicion higher for anxiety and/or GERD and/or esophageal spasm   0046 WBC(!): 11.09  Mild leukocytosis   0046 CBC and differential(!)  No gross anemia   0121 Potassium(!): 2.9  Will replete   0121 Comprehensive metabolic panel(!)  No other electrolyte derangement, no ELAINE, normal random glucose, no transaminitis   0122 hs TnI 0hr: 7  Will delta   0217 On reassessment, the patient is resting comfortably. She notes all pain is resolved. She notes her anxiety is markedly improved. Vital signs remain stable. Patient updated on results and plan for oral potassium repletion. Patient encouraged to early oral potassium with leafy greens and citrus fruits. Patient notes that she would like to go home. Bedside RN came into obtain delta troponin. Will start to prepare patient for discharge    0236 Pt requesting DC given pain resolved and anxiety markedly improved. She does not want to wait for repeat trop result. Given patient with family ember who will be with her and that she is responsive to being called for notification of abnormal result, will allow patient to leave the department. She overall appears well, is in no distress, with stable vital signs, has cardiology follow-up in 1.5 days. [address] paperwork reviewed with emphasis on follow-up with cardiology, follow-up with PCP, new prescriptions, and strict return precautions. Troponin negative making ACS very unlikely. Patient continues to appear well. She is medically stable at this time for discharge.              HEART Risk Score      Flowsheet Row Most Recent Value   Heart Score Risk Calculator    History 0 Filed at: 11/23/2023 0205   ECG 0 Filed at: 11/23/2023 0205   Age 2 Filed at: 11/23/2023 0205   Risk Factors 2 Filed at: 11/23/2023 0205   Troponin 0 Filed at: 11/23/2023 0205   HEART Score 4 Filed at: 11/23/2023 0205                          SBIRT 22yo+      Flowsheet Row Most Recent Value   Initial Alcohol Screen: US AUDIT-C     1. How often do you have a drink containing alcohol? 0 Filed at: 11/23/2023 0000   2. How many drinks containing alcohol do you have on a typical day you are drinking? 0 Filed at: 11/23/2023 0000   3a. Male UNDER 65: How often do you have five or more drinks on one occasion? 0 Filed at: 11/23/2023 0000   3b. FEMALE Any Age, or MALE 65+: How often do you have 4 or more drinks on one occassion? 0 Filed at: 11/23/2023 0000   Audit-C Score 0 Filed at: 11/23/2023 0000   URIEL: How many times in the past year have you. .. Used an illegal drug or used a prescription medication for non-medical reasons? Never Filed at: 11/23/2023 0000                      Medical Decision Making  DDx including but not limited to: Anxiety, GERD, gastritis, esophagitis; considered but less suspicious of ACS, MI      Problems Addressed:  Anxiety: acute illness or injury  Chest pain: acute illness or injury  GERD (gastroesophageal reflux disease): self-limited or minor problem    Amount and/or Complexity of Data Reviewed  Labs: ordered. Decision-making details documented in ED Course. Radiology: ordered and independent interpretation performed. ECG/medicine tests: ordered and independent interpretation performed. Risk  OTC drugs. Prescription drug management. Disposition  Final diagnoses:   Chest pain   Anxiety   GERD (gastroesophageal reflux disease)     Time reflects when diagnosis was documented in both MDM as applicable and the Disposition within this note       Time User Action Codes Description Comment    11/23/2023  2:45 AM Mallika Nata Add [R07.9] Chest pain     11/23/2023  2:45 AM Mallika Nata Add [F41.9] Anxiety     11/23/2023  2:45 AM Jorene Son, Thera Cabot Add [K21.9] GERD (gastroesophageal reflux disease)           ED Disposition       ED Disposition   Discharge    Condition   Stable    Date/Time   Thu Nov 23, 2023 [address] [place] [address] [address] discharge to home/self care. Follow-up Information       Follow up With Specialties Details Why Contact Info Additional [address], MD Internal Medicine Schedule an appointment as soon as possible for a visit on 11/27/2023  [address] [facility] [address]  [phone]       Jose Brady MD Cardiology Go on 11/24/2023  [address] [facility] [address]       [address] [facility] Emergency Department Emergency Medicine Go to  If symptoms worsen [address]  Emergency Department, [facility], [facility], [address]            Discharge Medication List as of 11/23/2023  2:54 AM        START taking these medications    Details   famotidine (PEPCID) 20 mg tablet Take 1 tablet (20 mg total) by mouth 2 (two) times a day for 14 days, Starting Thu 11/23/2023, Until Thu 12/7/2023, Normal      sucralfate (CARAFATE) 1 g tablet Take 1 tablet (1 g total) by mouth 3 (three) times a day as needed (gastroesophageal reflex (GERD)) for up to 7 days, Starting Thu 11/23/2023, Until Thu 11/30/2023 at 2359, Normal           CONTINUE these medications which have NOT CHANGED    Details   aspirin (ECOTRIN LOW STRENGTH) 81 mg EC tablet Take 81 mg by mouth daily, Historical Med      bisacodyl (DULCOLAX) 5 mg EC tablet Take 2 tablets (10 mg total) by mouth once for 1 dose Take once at 4pm evening before the procedure, Starting Wed 7/27/2022, Normal      levothyroxine 150 mcg tablet Take 150 mcg by mouth daily, Starting Sun 6/7/2020, Historical Med      LORazepam (ATIVAN) 1 mg tablet Take 1 mg by mouth every 8 (eight) hours as needed for anxiety, Historical Med      metoprolol succinate (TOPROL-XL) 50 mg 24 hr tablet Take 1 tablet (50 mg total) by mouth daily, Starting Mon 1/23/2023, Normal      PARoxetine (PAXIL) 30 mg tablet Take 30 mg by mouth every morning, Historical Med      polyethylene glycol (GOLYTELY) 4000 mL solution Take 4,000 mL by mouth once for 1 dose, Starting Wed 7/27/2022, Normal      rosuvastatin (CRESTOR) 10 MG tablet TAKE 1 TABLET ALTERNATING WITH 2 TABLETS EVERY OTHER DAY, Normal      traZODone (DESYREL) 50 mg tablet take 1 tablet by mouth at bedtime, Normal      triamterene-hydrochlorothiazide (MAXZIDE-25) 37.5-25 mg per tablet Take 1 tablet by mouth daily, Starting Tue 1/8/2019, Historical Med                 PDMP Review       None            ED Provider  Electronically Signed by             Ric Cervantes  11/23/23 4069

## 2023-11-23 NOTE — DISCHARGE INSTRUCTIONS
Go to your scheduled cardiology appointment. Take the prescribed famotidine (Pepcid) twice daily for the next 14 days. Use the prescribed sucralfate (Carafate) up to 3 times daily as needed for stomach acid, chest pain, belching, or indigestion symptoms. Return to the ER if you develop fever, new or worsening pain, difficulty breathing, vomiting, weakness, confusion, or lethargy.

## 2023-11-24 ENCOUNTER — OFFICE VISIT (OUTPATIENT)
Dept: CARDIOLOGY CLINIC | Facility: CLINIC | Age: 73
End: 2023-11-24
Payer: MEDICARE

## 2023-11-24 VITALS
OXYGEN SATURATION: 96 % | HEART RATE: 85 BPM | BODY MASS INDEX: 35.45 KG/M2 | SYSTOLIC BLOOD PRESSURE: 124 MMHG | WEIGHT: 213 LBS | DIASTOLIC BLOOD PRESSURE: 80 MMHG

## 2023-11-24 DIAGNOSIS — R06.09 DOE (DYSPNEA ON EXERTION): ICD-10-CM

## 2023-11-24 DIAGNOSIS — E78.5 HYPERLIPIDEMIA, UNSPECIFIED HYPERLIPIDEMIA TYPE: ICD-10-CM

## 2023-11-24 DIAGNOSIS — I25.10 CORONARY ARTERY CALCIFICATION SEEN ON CAT SCAN: ICD-10-CM

## 2023-11-24 DIAGNOSIS — R07.9 CHEST PAIN, UNSPECIFIED TYPE: Primary | ICD-10-CM

## 2023-11-24 DIAGNOSIS — E87.6 HYPOKALEMIA: ICD-10-CM

## 2023-11-24 PROCEDURE — 93000 ELECTROCARDIOGRAM COMPLETE: CPT | Performed by: INTERNAL MEDICINE

## 2023-11-24 PROCEDURE — 99214 OFFICE O/P EST MOD 30 MIN: CPT | Performed by: INTERNAL MEDICINE

## 2023-11-24 NOTE — PROGRESS NOTES
Cardiology   Ellie Bell 71 y.o. female MRN: [id number]   Encounter: [id number]        Reason for Consult / Principal Problem: Chest pain    Physician Requesting Consult:     PCP: Mary Escalante MD        Assessment/Plan:    >> Chest pain  >> Dyspnea on exertion  >> Hypokalemia  >> Coronary artery disease: CACS: 568  >> Dyslipidemia  >> Former 60 pack year smoking  >> Pre diabetes  >> Systolic heart murmur: Likely aortic sclerosis, S2 was preserved. Plan:    -Will check echo stress test, last nuclear test was equivocal will repeat stress with exercise to evaluate the patient's exercise capacity. Will consider cardiac catheterization in the future if necessary.  -Check BMP in 1 week to evaluate potassium levels  -Check cardiac BNP  -Increase Crestor to 20 mg daily, repeat lipids in 1 month, target LDL is less than 55  -Evaluate echocardiogram for structural heart disease, diastolic dysfunction given dyspnea on exertion  -Counseled on diet and exercise      CC: Chest pain    HPI  71 y.o. female to establish care and for evaluation of chest pain. She was in the emergency room yesterday for chest pain that began while she was visiting her sister who was admitted to the hospital with bloody emesis. She had 2 sets of troponin that were negative. He had stabbing chest pain that began on the left side of her chest and spread to both arms. She did not have any associated nausea or vomiting. She has had on and off chest pain for several months. She had a nuclear stress test (pharmacological), in January that showed "ischemia in the basal and mid LAD". She has dyspnea on exertion and gets winded even doing basic activities. She has been eating an unhealthy diet and lots of junk food.     She did quit smoking 1 year ago and has been able to stay away from cigarettes since that time        The patient deniespalpitations, orthopnea, PND, pedal edema, syncope, presyncope, diaphoresis, nausea/vomiting       The 10-year ASCVD risk score (Derrick LANDRY, et al., 2019) is: 9.5%    Values used to calculate the score:      Age: 71 years      Sex: Female      Is Non- : No      Diabetic: No      Tobacco smoker: No      Systolic Blood Pressure: 158 mmHg      Is BP treated: No      HDL Cholesterol: 55 mg/dL      Total Cholesterol: 167 mg/dL            Physical exam  Objective   Vitals: Blood pressure 124/80, pulse 85, weight 96.6 kg (213 lb), SpO2 96 %. General:  AO x3, no acute distress  Cardiac:  S1-S2 normal, 2 /6 systolic murmur in the right upper sternal border no rubs or gallops, JVP: normal  Lungs:  Clear to auscultation bilaterally, no wheezing or crackles. Abdomen:  Soft, nontender, nondistended. Extremities:  Warm, well perfused, pulses palpable, no ulcers or rashes. Edema:absent  Neuro: Grossly nonfocal        ======================================================  TREADMILL STRESS  No results found for this or any previous visit.     ----------------------------------------------------------------------------------------------  NUCLEAR STRESS TEST: No results found for this or any previous visit. Results for orders placed during the hospital encounter of 01/04/23    NM myocardial perfusion spect (rx stress and/or rest)    Interpretation Summary    Stress ECG: No ST deviation is noted. The ECG was negative for ischemia. The stress ECG is negative for ischemia after vasodilation and low level exercise, without reproduction of symptoms. Perfusion: There is a left ventricular perfusion defect that is medium in size with mild reduction in uptake present in the basal to mid anterior location(s) that is reversible. Stress Function: Left ventricular function post-stress is normal. Post-stress ejection fraction is 77 %.     Stress Combined Conclusion: The ECG and SPECT imaging portions of the stress study are discordant but are nonetheless concerning for inducible myocardial ischemia given the known limited sensitivity of stress electrocardiography. Left ventricular perfusion is abnormal. There is probable mild ischemia. in the basal and mid segments of the anterior wall.      --------------------------------------------------------------------------------  CATH:  No results found for this or any previous visit.    --------------------------------------------------------------------------------  ECHO:   No results found for this or any previous visit. No results found for this or any previous visit.    --------------------------------------------------------------------------------  HOLTER  No results found for this or any previous visit.    --------------------------------------------------------------------------------  CAROTIDS  Results for orders placed during the hospital encounter of 02/28/19    VAS carotid complete study    Narrative  [facility] REPORT  CLINICAL:  Indications:  Visual disturbance. Patient experienced "[place]" in her field of vision. These  episodes happened several times between the months of November and December but  have since the new year. Risk Factors  The patient has history of HTN, HLD and smoking (current) 0.75 ppd. Clinical  Right Pressure:  130/ mm Hg, Left Pressure:  122/ mm Hg. FINDINGS:    Right        Impression  PSV  EDV (cm/s)  Direction of Flow  Ratio  Dist. ICA                 57          25                      0.60  Mid. ICA                  79          34                      0.84  Prox.  ICA    1 - 49%      72          26                      0.77  Dist CCA                  66          32  Mid CCA                   94          29                      0.99  Prox CCA                  96          26  Ext Carotid               95          24                      1.01  Prox Vert                 36          15  Antegrade  Subclavian                70           0    Left         Impression  PSV  EDV (cm/s)  Direction of Flow Ratio  Dist. ICA                 45          20                      0.49  Mid. ICA                  71          31                      0.79  Prox. ICA    1 - 49%      80          30                      0.89  Dist CCA                  85          32  Mid CCA                   91          31                      0.91  Prox CCA                 100          28  Ext Carotid               80          15                      0.89  Prox Vert                 36          12  Antegrade  Subclavian               200           0        CONCLUSION:    Impression  RIGHT:  There is <50% stenosis noted in the internal carotid artery. Plaque is  homogenous and smooth. Vertebral artery flow is antegrade. There is no significant subclavian artery  disease. LEFT:  There is <50% stenosis noted in the internal carotid artery. Plaque is  homogenous and smooth. Vertebral artery flow is antegrade. There is no significant subclavian artery  disease. Internal carotid artery stenosis determination by consensus criteria from:  Mariah Diaz, et.al. Carotid Artery Stenosis: Gray-Scale and Doppler US Diagnosis  - Society of Radiologists in [address], Radiology 2003;  [phone]. SIGNATURE:  Electronically Signed by: Nicolás Ash on 2019-02-28 03:52:38 PM       Diagnoses and all orders for this visit:    Chest pain, unspecified type  -     POCT ECG  -     Echo complete w/ contrast if indicated; Future  -     NM myocardial perfusion spect (stress and/or rest); Future     (dyspnea on exertion)  -     POCT ECG  -     Echo complete w/ contrast if indicated; Future  -     NM myocardial perfusion spect (stress and/or rest); Future  -     B-Type Natriuretic Peptide(BNP); Future    Coronary artery calcification seen on CAT scan  -     POCT ECG  -     Lipid Panel With Direct LDL; Future    Hypokalemia  -     Basic metabolic panel;  Future    Hyperlipidemia, unspecified hyperlipidemia type ======================================================          Review of Systems  ROS as noted above, otherwise 12 point review of systems was performed and is negative. Historical Information   Past Medical History:   Diagnosis Date    Anxiety     Chronic kidney disease, stage III (moderate) (HCC)     Depression     Elevated hemoglobin A1c     Fluid retention     GERD (gastroesophageal reflux disease) 30 years ago    Graves disease     Hyperlipidemia     Hypertension     Hypertension, essential     Hypothyroidism     Insomnia     Migraine     Osteoporosis     Prediabetes      Past Surgical History:   Procedure Laterality Date    THYROIDECTOMY       Social History     Substance and Sexual Activity   Alcohol Use Not Currently    Comment: rare     Social History     Substance and Sexual Activity   Drug Use Never     Social History     Tobacco Use   Smoking Status Former    Packs/day: 1.00    Years: 51.00    Total pack years: 51.00    Types: Cigarettes    Start date: 1971   Smokeless Tobacco Never   Tobacco Comments    Smokes a pack a day     Family History   Problem Relation Age of Onset    Diabetes Mother     Parkinsonism Father     Diabetes Sister     Breast cancer Sister         bi-lateral masectomy     Dementia Sister         frontoemporal demantia, age 61     Diabetes Brother        Meds/Allergies   Hospital Medications:   No current facility-administered medications for this visit. Home Medications: (Not in a hospital admission)      No Known Allergies      Portions of the record may have been created with voice recognition software. Occasional wrong words or "sound a like" substitutions may have occurred due to the inherent limitations of voice recognition software. Read the chart carefully and recognize, using context, where substitutions have occurred.

## 2023-11-27 ENCOUNTER — TELEPHONE (OUTPATIENT)
Dept: CARDIOLOGY CLINIC | Facility: CLINIC | Age: 73
End: 2023-11-27

## 2023-11-27 NOTE — TELEPHONE ENCOUNTER
----- Message from Aspen Goff MD sent at 11/24/2023 12:25 PM EST -----  Can you please let her know that I changed her nuclear stress test to an echo stress, because of the equivocal prior nuclear stress. She did have a diagnostic echo stress in 2021 so it makes more sense to repeat that.

## 2023-12-19 ENCOUNTER — HOSPITAL ENCOUNTER (OUTPATIENT)
Dept: NON INVASIVE DIAGNOSTICS | Facility: CLINIC | Age: 73
Discharge: HOME | End: 2023-12-19
Payer: MEDICARE

## 2023-12-19 VITALS
HEART RATE: 75 BPM | HEIGHT: 65 IN | WEIGHT: 213 LBS | BODY MASS INDEX: 35.49 KG/M2 | SYSTOLIC BLOOD PRESSURE: 122 MMHG | OXYGEN SATURATION: 100 % | DIASTOLIC BLOOD PRESSURE: 74 MMHG

## 2023-12-19 DIAGNOSIS — R07.9 CHEST PAIN, UNSPECIFIED TYPE: ICD-10-CM

## 2023-12-19 DIAGNOSIS — R06.09 DOE (DYSPNEA ON EXERTION): ICD-10-CM

## 2023-12-19 LAB
CHEST PAIN STATEMENT: NORMAL
MAX DIASTOLIC BP: 92 MMHG
MAX HR PERCENT: 88 %
MAX HR: 131 BPM
MAX PREDICTED HEART RATE: 148 BPM
PROTOCOL NAME: NORMAL
RATE PRESSURE PRODUCT: NORMAL
REASON FOR TERMINATION: NORMAL
SL CV STRESS RECOVERY BP: NORMAL MMHG
SL CV STRESS RECOVERY HR: 83 BPM
SL CV STRESS RECOVERY O2 SAT: 97 %
SL CV STRESS STAGE REACHED: 2
STRESS ANGINA INDEX: 0
STRESS BASELINE BP: NORMAL MMHG
STRESS BASELINE HR: 75 BPM
STRESS O2 SAT REST: 100 %
STRESS PEAK HR: 131 BPM
STRESS POST ESTIMATED WORKLOAD: 7 METS
STRESS POST EXERCISE DUR MIN: 4 MIN
STRESS POST EXERCISE DUR MIN: 4 MIN
STRESS POST EXERCISE DUR SEC: 0 SEC
STRESS POST EXERCISE DUR SEC: 0 SEC
STRESS POST O2 SAT PEAK: 97 %
STRESS POST PEAK BP: 164 MMHG
STRESS POST PEAK HR: 131 BPM
STRESS POST PEAK SYSTOLIC BP: 164 MMHG
TARGET HR FORMULA: NORMAL
TEST INDICATION: NORMAL

## 2023-12-19 PROCEDURE — 93350 STRESS TTE ONLY: CPT | Performed by: INTERNAL MEDICINE

## 2023-12-19 PROCEDURE — 93350 STRESS TTE ONLY: CPT

## 2024-01-04 LAB
BNP SERPL-MCNC: 36 PG/ML
BUN SERPL-MCNC: 16 MG/DL (ref 7–25)
BUN/CREAT SERPL: 14 (CALC) (ref 6–22)
CALCIUM SERPL-MCNC: 9.4 MG/DL (ref 8.6–10.4)
CHLORIDE SERPL-SCNC: 101 MMOL/L (ref 98–110)
CHOLEST SERPL-MCNC: 153 MG/DL
CHOLEST/HDLC SERPL: 2.4 (CALC)
CO2 SERPL-SCNC: 26 MMOL/L (ref 20–32)
CREAT SERPL-MCNC: 1.13 MG/DL (ref 0.6–1)
GFR/BSA.PRED SERPLBLD CYS-BASED-ARV: 52 ML/MIN/1.73M2
GLUCOSE SERPL-MCNC: 152 MG/DL (ref 65–99)
HDLC SERPL-MCNC: 63 MG/DL
LDLC SERPL CALC-MCNC: 69 MG/DL (CALC)
NONHDLC SERPL-MCNC: 90 MG/DL (CALC)
POTASSIUM SERPL-SCNC: 3.5 MMOL/L (ref 3.5–5.3)
SODIUM SERPL-SCNC: 139 MMOL/L (ref 135–146)
TRIGL SERPL-MCNC: 125 MG/DL

## 2024-01-08 ENCOUNTER — TELEPHONE (OUTPATIENT)
Dept: CARDIOLOGY CLINIC | Facility: CLINIC | Age: 74
End: 2024-01-08

## 2024-01-20 DIAGNOSIS — R00.0 TACHYCARDIA: ICD-10-CM

## 2024-01-22 ENCOUNTER — TELEPHONE (OUTPATIENT)
Dept: CARDIOLOGY CLINIC | Facility: CLINIC | Age: 74
End: 2024-01-22

## 2024-01-22 DIAGNOSIS — E78.5 HYPERLIPIDEMIA, UNSPECIFIED HYPERLIPIDEMIA TYPE: Primary | ICD-10-CM

## 2024-01-22 RX ORDER — ROSUVASTATIN CALCIUM 20 MG/1
20 TABLET, COATED ORAL DAILY
Qty: 90 TABLET | Refills: 1 | Status: SHIPPED | OUTPATIENT
Start: 2024-01-22

## 2024-01-22 NOTE — TELEPHONE ENCOUNTER
Left message on patient's voicemail confirming patient's total daily dose of crestor should be 20 mg daily per last office visit on 11/24/2023

## 2024-01-25 RX ORDER — METOPROLOL SUCCINATE 50 MG/1
50 TABLET, EXTENDED RELEASE ORAL DAILY
Qty: 90 TABLET | Refills: 3 | Status: SHIPPED | OUTPATIENT
Start: 2024-01-25

## 2024-01-27 ENCOUNTER — NURSE TRIAGE (OUTPATIENT)
Dept: OTHER | Facility: OTHER | Age: 74
End: 2024-01-27

## 2024-01-27 NOTE — TELEPHONE ENCOUNTER
Reason for Disposition   Third attempt to contact caller AND no contact made. Phone number verified.    Protocols used: No Contact or Duplicate Contact Call-ADULT-      3rd attempt to reach patient. Advised she call us back for any further assistance. Checked home phone as well and is no longer in service.

## 2024-01-27 NOTE — TELEPHONE ENCOUNTER
"Regarding: possible overdose rosuvastatin/poison control  ----- Message from Karis Spencer sent at 1/27/2024 11:09 AM EST -----  Pt stated, \"I may of accidentally taken two pills instead of one of my rosuvastatin 20 mg. If I did take two I took one at 0730 and the second at 1030.\"    Pt was transferred to poison control. Pt made aware nurse would be following up with her afterwards.    "

## 2024-01-29 ENCOUNTER — HOSPITAL ENCOUNTER (OUTPATIENT)
Dept: NON INVASIVE DIAGNOSTICS | Facility: CLINIC | Age: 74
Discharge: HOME | End: 2024-01-29
Payer: MEDICARE

## 2024-01-29 VITALS
WEIGHT: 213 LBS | HEIGHT: 65 IN | SYSTOLIC BLOOD PRESSURE: 122 MMHG | DIASTOLIC BLOOD PRESSURE: 74 MMHG | HEART RATE: 75 BPM | BODY MASS INDEX: 35.49 KG/M2

## 2024-01-29 DIAGNOSIS — R06.09 DOE (DYSPNEA ON EXERTION): ICD-10-CM

## 2024-01-29 DIAGNOSIS — R07.9 CHEST PAIN, UNSPECIFIED TYPE: ICD-10-CM

## 2024-01-29 LAB
AORTIC ROOT: 2.3 CM
APICAL FOUR CHAMBER EJECTION FRACTION: 68 %
ASCENDING AORTA: 3 CM
BSA FOR ECHO PROCEDURE: 2.03 M2
E WAVE DECELERATION TIME: 274 MS
E/A RATIO: 1.02
FRACTIONAL SHORTENING: 43 (ref 28–44)
INTERVENTRICULAR SEPTUM IN DIASTOLE (PARASTERNAL SHORT AXIS VIEW): 1.1 CM
INTERVENTRICULAR SEPTUM: 1.1 CM (ref 0.6–1.1)
IVC: 1.1 MM
LAAS-AP2: 13.9 CM2
LAAS-AP4: 16.8 CM2
LEFT ATRIUM SIZE: 3.7 CM
LEFT ATRIUM VOLUME (MOD BIPLANE): 34 ML
LEFT ATRIUM VOLUME INDEX (MOD BIPLANE): 16.7 ML/M2
LEFT INTERNAL DIMENSION IN SYSTOLE: 2.1 CM (ref 2.1–4)
LEFT VENTRICULAR INTERNAL DIMENSION IN DIASTOLE: 3.7 CM (ref 3.5–6)
LEFT VENTRICULAR POSTERIOR WALL IN END DIASTOLE: 1.1 CM
LEFT VENTRICULAR STROKE VOLUME: 41 ML
LVSV (TEICH): 41 ML
MV E'TISSUE VEL-LAT: 8 CM/S
MV E'TISSUE VEL-SEP: 7 CM/S
MV PEAK A VEL: 1.24 M/S
MV PEAK E VEL: 127 CM/S
MV STENOSIS PRESSURE HALF TIME: 79 MS
MV VALVE AREA P 1/2 METHOD: 2.78
RA PRESSURE ESTIMATED: 3 MMHG
RIGHT ATRIUM AREA SYSTOLE A4C: 8.7 CM2
RIGHT VENTRICLE ID DIMENSION: 2.3 CM
RV PSP: 37 MMHG
SL CV LEFT ATRIUM LENGTH A2C: 5.6 CM
SL CV LV EF: 65
SL CV PED ECHO LEFT VENTRICLE DIASTOLIC VOLUME (MOD BIPLANE) 2D: 56 ML
SL CV PED ECHO LEFT VENTRICLE SYSTOLIC VOLUME (MOD BIPLANE) 2D: 15 ML
TR MAX PG: 34 MMHG
TR PEAK VELOCITY: 2.9 M/S
TRICUSPID ANNULAR PLANE SYSTOLIC EXCURSION: 1.9 CM
TRICUSPID VALVE PEAK REGURGITATION VELOCITY: 2.93 M/S

## 2024-01-29 PROCEDURE — 93306 TTE W/DOPPLER COMPLETE: CPT | Performed by: INTERNAL MEDICINE

## 2024-01-29 PROCEDURE — 93306 TTE W/DOPPLER COMPLETE: CPT

## 2024-02-20 ENCOUNTER — NURSE TRIAGE (OUTPATIENT)
Age: 74
End: 2024-02-20

## 2024-02-20 DIAGNOSIS — R10.13 EPIGASTRIC PAIN: Primary | ICD-10-CM

## 2024-02-20 RX ORDER — SUCRALFATE 1 G/1
1 TABLET ORAL 2 TIMES DAILY PRN
Qty: 30 TABLET | Refills: 2 | Status: SHIPPED | OUTPATIENT
Start: 2024-02-20

## 2024-02-20 NOTE — TELEPHONE ENCOUNTER
Patient contacted office with complaints of severe stomach pain. Call transferred to nurse triage to further assist.

## 2024-02-20 NOTE — TELEPHONE ENCOUNTER
"Last OV: 7/27/22 Abnormal finding on GI tract imaging   Next OV: 7/10/24 Transferring care  EGD/Colonoscopy 2/24/23    Pt reports severe stomach pain 6-8 hrs after eating. Started a few months ago; intermittent. Pain starts in back then epigastric region, radiates to shoulder. Feels like it may be gas. Denies SOB. Was in ER to for same symptoms in November; ruled out cardiac event. Was prescribed Carafate and Pepcid in ED. Denies ever starting Pepcid. States Carafate was very effective. Tried Gas-X, dietary changes. Hesitant to try Mylanta as she is on cardiac meds. Has occasional nausea which she attributes to sliding hiatal hernia. Also reports occasional constipation and tried Dulcolax however makes her sick. Pt was advised to try Miralax daily and titrate.    Pt to move to clears. Pt aware to proceed to ED for any worsening. Pt will try Pepcid hs and requesting Rx for Carafate. Uses [facility].     Pt [phone]  Reason for Disposition   Abdominal pain is a chronic symptom (recurrent or ongoing AND lasting > 4 weeks)     Pt will try Carafate and Pepcid first    Answer Assessment - Initial Assessment Questions  1. LOCATION: \"Where does it hurt?\"       Under ribs  2. RADIATION: \"Does the pain shoot anywhere else?\" (e.g., chest, back)      Back and shoulders  3. ONSET: \"When did the pain begin?\" (e.g., minutes, hours or days ago)       Months  4. SUDDEN: \"Gradual or sudden onset?\"      gradual  5. PATTERN \"Does the pain come and go, or is it constant?\"     - If constant: \"Is it getting better, staying the same, or worsening?\"       (Note: Constant means the pain never goes away completely; most serious pain is constant and it progresses)      - If intermittent: \"How long does it last?\" \"Do you have pain now?\"      (Note: Intermittent means the pain goes away completely between bouts)      Intermittent   6. SEVERITY: \"How bad is the pain?\"  (e.g., Scale 1-10; mild, moderate, or severe)    - SEVERE (8-10): excruciating " "pain, doubled over, unable to do any normal activities        Up to 10  7. RECURRENT SYMPTOM: \"Have you ever had this type of stomach pain before?\" If Yes, ask: \"When was the last time?\" and \"What happened that time?\"       Yes  8. AGGRAVATING FACTORS: \"Does anything seem to cause this pain?\" (e.g., foods, stress, alcohol)      Food  9. CARDIAC SYMPTOMS: \"Do you have any of the following symptoms: chest pain, difficulty breathing, sweating, nausea?\"      Seen in ED for same issue  10. OTHER SYMPTOMS: \"Do you have any other symptoms?\" (e.g., fever, vomiting, diarrhea)        Nausea/vomiting    Protocols used: Abdominal Pain - Upper-ADULT-[place]    "

## 2024-02-28 ENCOUNTER — OFFICE VISIT (OUTPATIENT)
Dept: CARDIOLOGY CLINIC | Facility: CLINIC | Age: 74
End: 2024-02-28
Payer: MEDICARE

## 2024-02-28 VITALS
WEIGHT: 206.1 LBS | DIASTOLIC BLOOD PRESSURE: 66 MMHG | SYSTOLIC BLOOD PRESSURE: 120 MMHG | HEIGHT: 65 IN | HEART RATE: 76 BPM | OXYGEN SATURATION: 97 % | BODY MASS INDEX: 34.34 KG/M2

## 2024-02-28 DIAGNOSIS — R07.9 CHEST PAIN: ICD-10-CM

## 2024-02-28 DIAGNOSIS — I25.10 CORONARY ARTERY DISEASE INVOLVING NATIVE CORONARY ARTERY OF NATIVE HEART WITHOUT ANGINA PECTORIS: ICD-10-CM

## 2024-02-28 DIAGNOSIS — E78.5 HYPERLIPIDEMIA, UNSPECIFIED HYPERLIPIDEMIA TYPE: Primary | ICD-10-CM

## 2024-02-28 DIAGNOSIS — E66.01 OBESITY, MORBID (HCC): ICD-10-CM

## 2024-02-28 DIAGNOSIS — N18.31 STAGE 3A CHRONIC KIDNEY DISEASE (HCC): ICD-10-CM

## 2024-02-28 PROCEDURE — 99214 OFFICE O/P EST MOD 30 MIN: CPT | Performed by: INTERNAL MEDICINE

## 2024-02-28 NOTE — PROGRESS NOTES
"Cardiology   Abi Murray 73 y.o. female MRN: [id number]   Encounter: [id number]        Reason for Consult / Principal Problem: Chest pain    Physician Requesting Consult:     PCP: Aisha Anders MD        Assessment/Plan:    >> Chest pain  >> Dyspnea on exertion  >> Hypokalemia  >> Coronary artery disease: CACS: 568  >> Dyslipidemia  >> Former 60 pack year smoking  >> Pre diabetes  >> Systolic heart murmur: Likely aortic sclerosis, S2 was preserved.    Plan:    -Continue current medication regimen and repeat lipid profile with direct LDL, may need to uptitrate rosuvastatin or add Zetia.  -Cardiac BNP was unremarkable  -Continue baby aspirin  Continue metoprolol succinate 50 mg/day  -Continue Crestor to 20 mg daily,  target LDL is less than 55  -Counseled on diet and exercise        2/28/24: Feeling fatigued otherwise, overall doing okay.  Denies any chest pain.  Although her exercise capacity was reduced on stress test, she did reach her target heart rate and I reviewed the echo images personally, no wall motion abnormality seen and cardiac function augments appropriately with stress.    CC: Chest pain    HPI  73 y.o. female to establish care and for evaluation of chest pain.  She was in the emergency room yesterday for chest pain that began while she was visiting her sister who was admitted to the hospital with bloody emesis.  She had 2 sets of troponin that were negative.  He had stabbing chest pain that began on the left side of her chest and spread to both arms.  She did not have any associated nausea or vomiting.    She has had on and off chest pain for several months.  She had a nuclear stress test (pharmacological), in January that showed \"ischemia in the basal and mid LAD\".    She has dyspnea on exertion and gets winded even doing basic activities.    She has been eating an unhealthy diet and lots of junk food.    She did quit smoking 1 year ago and has been able to stay away from cigarettes since that " "time        The patient deniespalpitations, orthopnea, PND, pedal edema, syncope, presyncope, diaphoresis, nausea/vomiting       The 10-year ASCVD risk score (Derrick LANDRY, et al., 2019) is: 9.7%    Values used to calculate the score:      Age: 73 years      Sex: Female      Is Non- : No      Diabetic: No      Tobacco smoker: No      Systolic Blood Pressure: 120 mmHg      Is BP treated: No      HDL Cholesterol: 63 mg/dL      Total Cholesterol: 153 mg/dL            Physical exam  Objective   Vitals: Blood pressure 120/66, pulse 76, height 5' 5\" (1.651 m), weight 93.5 kg (206 lb 1.6 oz), SpO2 97%.    General:  AO x3, no acute distress  Cardiac:  S1-S2 normal, 2 /6 systolic murmur in the right upper sternal border no rubs or gallops, JVP: normal  Lungs:  Clear to auscultation bilaterally, no wheezing or crackles.  Abdomen:  Soft, nontender, nondistended.  Extremities:  Warm, well perfused, pulses palpable, no ulcers or rashes. Edema:absent  Neuro: Grossly nonfocal        ======================================================  TREADMILL STRESS  No results found for this or any previous visit.     ----------------------------------------------------------------------------------------------  NUCLEAR STRESS TEST: No results found for this or any previous visit.    Results for orders placed during the hospital encounter of 01/04/23    NM myocardial perfusion spect (rx stress and/or rest)    Interpretation Summary    Stress ECG: No ST deviation is noted. The ECG was negative for ischemia. The stress ECG is negative for ischemia after vasodilation and low level exercise, without reproduction of symptoms.    Perfusion: There is a left ventricular perfusion defect that is medium in size with mild reduction in uptake present in the basal to mid anterior location(s) that is reversible.    Stress Function: Left ventricular function post-stress is normal. Post-stress ejection fraction is 77 %.    Stress " "Combined Conclusion: The ECG and SPECT imaging portions of the stress study are discordant but are nonetheless concerning for inducible myocardial ischemia given the known limited sensitivity of stress electrocardiography. Left ventricular perfusion is abnormal. There is probable mild ischemia.in the basal and mid segments of the anterior wall.      --------------------------------------------------------------------------------  CATH:  No results found for this or any previous visit.    --------------------------------------------------------------------------------  ECHO:   No results found for this or any previous visit.    No results found for this or any previous visit.    --------------------------------------------------------------------------------  HOLTER  No results found for this or any previous visit.    --------------------------------------------------------------------------------  CAROTIDS  Results for orders placed during the hospital encounter of 02/28/19    VAS carotid complete study    Narrative  [facility] REPORT  CLINICAL:  Indications:  Visual disturbance.  Patient experienced \"kaleidoscopic tonio\" in her field of vision.  These  episodes happened several times between the months of November and December but  have since the new year.  Risk Factors  The patient has history of HTN, HLD and smoking (current) 0.75 ppd.  Clinical  Right Pressure:  130/ mm Hg, Left Pressure:  122/ mm Hg.    FINDINGS:    Right        Impression  PSV  EDV (cm/s)  Direction of Flow  Ratio  Dist. ICA                 57          25                      0.60  Mid. ICA                  79          34                      0.84  Prox. ICA    1 - 49%      72          26                      0.77  Dist CCA                  66          32  Mid CCA                   94          29                      0.99  Prox CCA                  96          26  Ext Carotid               95          24                      " 1.01  Prox Vert                 36          15  Antegrade  Subclavian                70           0    Left         Impression  PSV  EDV (cm/s)  Direction of Flow  Ratio  Dist. ICA                 45          20                      0.49  Mid. ICA                  71          31                      0.79  Prox. ICA    1 - 49%      80          30                      0.89  Dist CCA                  85          32  Mid CCA                   91          31                      0.91  Prox CCA                 100          28  Ext Carotid               80          15                      0.89  Prox Vert                 36          12  Antegrade  Subclavian               200           0        CONCLUSION:    Impression  RIGHT:  There is <50% stenosis noted in the internal carotid artery. Plaque is  homogenous and smooth.  Vertebral artery flow is antegrade. There is no significant subclavian artery  disease.  LEFT:  There is <50% stenosis noted in the internal carotid artery. Plaque is  homogenous and smooth.  Vertebral artery flow is antegrade. There is no significant subclavian artery  disease.    Internal carotid artery stenosis determination by consensus criteria from:  SHAREE Lizarraga, et.al. Carotid Artery Stenosis: Gray-Scale and Doppler US Diagnosis  - Society of Radiologists in Ultrasound Consensus Conference, Radiology 2003;  [id number].    SIGNATURE:  Electronically Signed by: ANDREA NEAL on 2019-02-28 03:52:38 PM       Diagnoses and all orders for this visit:    Hyperlipidemia, unspecified hyperlipidemia type  -     Lipid Panel With Direct LDL; Future    Chest pain  -     Ambulatory Referral to Cardiology    Coronary artery disease involving native coronary artery of native heart without angina pectoris  -     Lipid Panel With Direct LDL; Future    Obesity, morbid (HCC)    Stage 3a chronic kidney disease (HCC)       ======================================================          Review of Systems  ROS as noted  "above, otherwise 12 point review of systems was performed and is negative.     Historical Information   Past Medical History:   Diagnosis Date    Anxiety     Chronic kidney disease, stage III (moderate) (HCC)     Depression     Elevated hemoglobin A1c     Fluid retention     GERD (gastroesophageal reflux disease) 30 years ago    Graves disease     Hyperlipidemia     Hypertension     Hypertension, essential     Hypothyroidism     Insomnia     Migraine     Osteoporosis     Prediabetes      Past Surgical History:   Procedure Laterality Date    THYROIDECTOMY       Social History     Substance and Sexual Activity   Alcohol Use Not Currently    Comment: rare     Social History     Substance and Sexual Activity   Drug Use Never     Social History     Tobacco Use   Smoking Status Former    Current packs/day: 1.00    Average packs/day: 1 pack/day for 53.2 years (53.2 ttl pk-yrs)    Types: Cigarettes    Start date: 1971   Smokeless Tobacco Never   Tobacco Comments    Smokes a pack a day     Family History   Problem Relation Age of Onset    Diabetes Mother     Parkinsonism Father     Diabetes Sister     Breast cancer Sister         bi-lateral masectomy     Dementia Sister         frontoemporal demantia, age 60     Diabetes Brother        Meds/Allergies   Hospital Medications:   No current facility-administered medications for this visit.     Home Medications: (Not in a hospital admission)      No Known Allergies      Portions of the record may have been created with voice recognition software.  Occasional wrong words or \"sound a like\" substitutions may have occurred due to the inherent limitations of voice recognition software.  Read the chart carefully and recognize, using context, where substitutions have occurred.        I spent > 35 mins examining and interviewing the patient, coordinating care, reviewing the chart, reviewing testing and writing the note.                   "

## 2024-02-28 NOTE — LETTER
February 28, 2024     Aisha Anders MD  [address] [facility]  [address]  [facility] [address]    Patient: Abi Murray   YOB: 1950   Date of Visit: 2/28/2024       Dear Dr. Anders:    Thank you for referring Abi Murray to me for evaluation. Below are my notes for this consultation.    If you have questions, please do not hesitate to call me. I look forward to following your patient along with you.         Sincerely,        Arcelia Umana MD        CC: No Recipients    Arcelia Umana MD  2/28/2024 10:57 AM  Sign when Signing Visit  Cardiology   Abi Murray 73 y.o. female MRN: [id number]   Encounter: [id number]        Reason for Consult / Principal Problem: Chest pain    Physician Requesting Consult:     PCP: Aisha Anders MD        Assessment/Plan:    >> Chest pain  >> Dyspnea on exertion  >> Hypokalemia  >> Coronary artery disease: CACS: 568  >> Dyslipidemia  >> Former 60 pack year smoking  >> Pre diabetes  >> Systolic heart murmur: Likely aortic sclerosis, S2 was preserved.    Plan:    -Continue current medication regimen and repeat lipid profile with direct LDL, may need to uptitrate rosuvastatin or add Zetia.  -Cardiac BNP was unremarkable  -Continue baby aspirin  Continue metoprolol succinate 50 mg/day  -Continue Crestor to 20 mg daily,  target LDL is less than 55  -Counseled on diet and exercise        2/28/24: Feeling fatigued otherwise, overall doing okay.  Denies any chest pain.  Although her exercise capacity was reduced on stress test, she did reach her target heart rate and I reviewed the echo images personally, no wall motion abnormality seen and cardiac function augments appropriately with stress.    CC: Chest pain    HPI  73 y.o. female to establish care and for evaluation of chest pain.  She was in the emergency room yesterday for chest pain that began while she was visiting her sister who was admitted to the hospital with bloody emesis.  She had 2 sets of troponin that were  "negative.  He had stabbing chest pain that began on the left side of her chest and spread to both arms.  She did not have any associated nausea or vomiting.    She has had on and off chest pain for several months.  She had a nuclear stress test (pharmacological), in January that showed \"ischemia in the basal and mid LAD\".    She has dyspnea on exertion and gets winded even doing basic activities.    She has been eating an unhealthy diet and lots of junk food.    She did quit smoking 1 year ago and has been able to stay away from cigarettes since that time        The patient deniespalpitations, orthopnea, PND, pedal edema, syncope, presyncope, diaphoresis, nausea/vomiting       The 10-year ASCVD risk score (Derrick LANDRY, et al., 2019) is: 9.7%    Values used to calculate the score:      Age: 73 years      Sex: Female      Is Non- : No      Diabetic: No      Tobacco smoker: No      Systolic Blood Pressure: 120 mmHg      Is BP treated: No      HDL Cholesterol: 63 mg/dL      Total Cholesterol: 153 mg/dL            Physical exam  Objective  Vitals: Blood pressure 120/66, pulse 76, height 5' 5\" (1.651 m), weight 93.5 kg (206 lb 1.6 oz), SpO2 97%.    General:  AO x3, no acute distress  Cardiac:  S1-S2 normal, 2 /6 systolic murmur in the right upper sternal border no rubs or gallops, JVP: normal  Lungs:  Clear to auscultation bilaterally, no wheezing or crackles.  Abdomen:  Soft, nontender, nondistended.  Extremities:  Warm, well perfused, pulses palpable, no ulcers or rashes. Edema:absent  Neuro: Grossly nonfocal        ======================================================  TREADMILL STRESS  No results found for this or any previous visit.     ----------------------------------------------------------------------------------------------  NUCLEAR STRESS TEST: No results found for this or any previous visit.    Results for orders placed during the hospital encounter of 01/04/23    NM myocardial " "perfusion spect (rx stress and/or rest)    Interpretation Summary  •  Stress ECG: No ST deviation is noted. The ECG was negative for ischemia. The stress ECG is negative for ischemia after vasodilation and low level exercise, without reproduction of symptoms.  •  Perfusion: There is a left ventricular perfusion defect that is medium in size with mild reduction in uptake present in the basal to mid anterior location(s) that is reversible.  •  Stress Function: Left ventricular function post-stress is normal. Post-stress ejection fraction is 77 %.  •  Stress Combined Conclusion: The ECG and SPECT imaging portions of the stress study are discordant but are nonetheless concerning for inducible myocardial ischemia given the known limited sensitivity of stress electrocardiography. Left ventricular perfusion is abnormal. There is probable mild ischemia.in the basal and mid segments of the anterior wall.      --------------------------------------------------------------------------------  CATH:  No results found for this or any previous visit.    --------------------------------------------------------------------------------  ECHO:   No results found for this or any previous visit.    No results found for this or any previous visit.    --------------------------------------------------------------------------------  HOLTER  No results found for this or any previous visit.    --------------------------------------------------------------------------------  CAROTIDS  Results for orders placed during the hospital encounter of 02/28/19    VAS carotid complete study    Narrative  [facility] REPORT  CLINICAL:  Indications:  Visual disturbance.  Patient experienced \"kaleidoscopic tonio\" in her field of vision.  These  episodes happened several times between the months of November and December but  have since the new year.  Risk Factors  The patient has history of HTN, HLD and smoking (current) 0.75 ppd.  Clinical  Right " Pressure:  130/ mm Hg, Left Pressure:  122/ mm Hg.    FINDINGS:    Right        Impression  PSV  EDV (cm/s)  Direction of Flow  Ratio  Dist. ICA                 57          25                      0.60  Mid. ICA                  79          34                      0.84  Prox. ICA    1 - 49%      72          26                      0.77  Dist CCA                  66          32  Mid CCA                   94          29                      0.99  Prox CCA                  96          26  Ext Carotid               95          24                      1.01  Prox Vert                 36          15  Antegrade  Subclavian                70           0    Left         Impression  PSV  EDV (cm/s)  Direction of Flow  Ratio  Dist. ICA                 45          20                      0.49  Mid. ICA                  71          31                      0.79  Prox. ICA    1 - 49%      80          30                      0.89  Dist CCA                  85          32  Mid CCA                   91          31                      0.91  Prox CCA                 100          28  Ext Carotid               80          15                      0.89  Prox Vert                 36          12  Antegrade  Subclavian               200           0        CONCLUSION:    Impression  RIGHT:  There is <50% stenosis noted in the internal carotid artery. Plaque is  homogenous and smooth.  Vertebral artery flow is antegrade. There is no significant subclavian artery  disease.  LEFT:  There is <50% stenosis noted in the internal carotid artery. Plaque is  homogenous and smooth.  Vertebral artery flow is antegrade. There is no significant subclavian artery  disease.    Internal carotid artery stenosis determination by consensus criteria from:  WESTON Lizarraga., et.al. Carotid Artery Stenosis: Gray-Scale and Doppler US Diagnosis  - Society of Radiologists in Ultrasound Consensus Conference, Radiology 2003;  229:340-346.    SIGNATURE:  Electronically Signed  by: ANDREA NEAL on 2019-02-28 03:52:38 PM       Diagnoses and all orders for this visit:    Hyperlipidemia, unspecified hyperlipidemia type  -     Lipid Panel With Direct LDL; Future    Chest pain  -     Ambulatory Referral to Cardiology    Coronary artery disease involving native coronary artery of native heart without angina pectoris  -     Lipid Panel With Direct LDL; Future    Obesity, morbid (HCC)    Stage 3a chronic kidney disease (HCC)       ======================================================          Review of Systems  ROS as noted above, otherwise 12 point review of systems was performed and is negative.     Historical Information  Past Medical History:   Diagnosis Date   • Anxiety    • Chronic kidney disease, stage III (moderate) (HCC)    • Depression    • Elevated hemoglobin A1c    • Fluid retention    • GERD (gastroesophageal reflux disease) 30 years ago   • Graves disease    • Hyperlipidemia    • Hypertension    • Hypertension, essential    • Hypothyroidism    • Insomnia    • Migraine    • Osteoporosis    • Prediabetes      Past Surgical History:   Procedure Laterality Date   • THYROIDECTOMY       Social History     Substance and Sexual Activity   Alcohol Use Not Currently    Comment: rare     Social History     Substance and Sexual Activity   Drug Use Never     Social History     Tobacco Use   Smoking Status Former   • Current packs/day: 1.00   • Average packs/day: 1 pack/day for 53.2 years (53.2 ttl pk-yrs)   • Types: Cigarettes   • Start date: 1971   Smokeless Tobacco Never   Tobacco Comments    Smokes a pack a day     Family History   Problem Relation Age of Onset   • Diabetes Mother    • Parkinsonism Father    • Diabetes Sister    • Breast cancer Sister         bi-lateral masectomy    • Dementia Sister         frontoemporal demantia, age 60    • Diabetes Brother        Meds/Allergies  Hospital Medications:   No current facility-administered medications for this visit.     Home Medications:  "(Not in a hospital admission)      No Known Allergies      Portions of the record may have been created with voice recognition software.  Occasional wrong words or \"sound a like\" substitutions may have occurred due to the inherent limitations of voice recognition software.  Read the chart carefully and recognize, using context, where substitutions have occurred.        I spent > 35 mins examining and interviewing the patient, coordinating care, reviewing the chart, reviewing testing and writing the note.                   "

## 2024-03-20 ENCOUNTER — OFFICE VISIT (OUTPATIENT)
Dept: GASTROENTEROLOGY | Facility: CLINIC | Age: 74
End: 2024-03-20
Payer: MEDICARE

## 2024-03-20 VITALS
DIASTOLIC BLOOD PRESSURE: 68 MMHG | SYSTOLIC BLOOD PRESSURE: 122 MMHG | WEIGHT: 202 LBS | TEMPERATURE: 98.6 F | HEIGHT: 65 IN | BODY MASS INDEX: 33.66 KG/M2

## 2024-03-20 DIAGNOSIS — Z12.11 SCREENING FOR COLON CANCER: ICD-10-CM

## 2024-03-20 DIAGNOSIS — R10.13 EPIGASTRIC PAIN: Primary | ICD-10-CM

## 2024-03-20 DIAGNOSIS — R10.13 DYSPEPSIA: ICD-10-CM

## 2024-03-20 DIAGNOSIS — R13.10 DYSPHAGIA, UNSPECIFIED TYPE: ICD-10-CM

## 2024-03-20 PROCEDURE — 99214 OFFICE O/P EST MOD 30 MIN: CPT | Performed by: INTERNAL MEDICINE

## 2024-03-20 RX ORDER — OMEPRAZOLE 40 MG/1
40 CAPSULE, DELAYED RELEASE ORAL DAILY
Qty: 30 CAPSULE | Refills: 3 | Status: SHIPPED | OUTPATIENT
Start: 2024-03-20

## 2024-03-20 NOTE — PROGRESS NOTES
[facility] - Outpatient Consultation  Abi Murray 73 y.o. female MRN: [id number]  Encounter: [id number]      PCP: Aisha Anders MD  Referring: No referring provider defined for this encounter.      ASSESSMENT AND PLAN:     73 yr old F w/ history of ex smoker, coronary artery disease, diabetic, HLD, thyroidism who presents to GI clinic for evaluation of abdominal bloating and pain.    Dyspepsia  GERD  Dysphagia    We will schedule for an upper endoscopy to evaluate for peptic ulcer disease, Helicobacter pylori infection, reflux esophagitis, peptic stricture, and eosinophilic esophagitis.  Recommend starting omeprazole 40 mg daily and Pepcid as needed  Trial of low FODMAP diet  If above workup is unremarkable and no improvement in symptoms can consider gastric emptying study      4.  Colon cancer screening    Patient refuses colonoscopy or any stool based tests this is similar to her discussion in 2022 where she had a bad experience with last colonoscopy and does not want to undergo any more colonoscopies for that reason.  She does not want stool based test as if positive she will have anxiety about the test.    Follow up in clinic after EGD     Francesca Zuniga MD   Gastroenterology Fellow      ______________________________________________________________________    CC:  Chief Complaint   Patient presents with    Follow-up    Constipation    Abdominal Pain     Gas bloating       HPI:  73 yr old F w/ history of ex smoker, coronary artery disease, diabetic, HLD, thyroidism who presents to GI clinic for evaluation of abdominal bloating and pain.    She is complaining of generalized abdominal pain since August. No association with food. She took sucralfate and helps with pain.  She denies any significant heartburn but does feel like there is regurgitation of acid especially when she lies down.  She is not taking any PPI or H2 blockers.  Does endorse dysphagia to solids.  Has had intentional  weight loss.    Reports getting COVID-vaccine sometime in July was concerned if her symptoms of abdominal discomfort were related to that.    She denies any significant constipation.  States that when she does get constipated and takes bisacodyl she would have accidents and therefore is unable to tolerate any laxatives.          REVIEW OF SYSTEMS:    CONSTITUTIONAL: Denies any fever, chills, rigors, and weight loss.  HEENT: No earache or tinnitus. Denies hearing loss or visual disturbances.  CARDIOVASCULAR: No chest pain or palpitations.   RESPIRATORY: Denies any cough, hemoptysis, shortness of breath or dyspnea on exertion.  GASTROINTESTINAL: As noted in the History of Present Illness.   GENITOURINARY: No problems with urination. Denies any hematuria or dysuria.  NEUROLOGIC: No dizziness or vertigo, denies headaches.   MUSCULOSKELETAL: Denies any muscle or joint pain.   SKIN: Denies skin rashes or itching.   ENDOCRINE: Denies excessive thirst. Denies intolerance to heat or cold.  PSYCHOSOCIAL: Denies depression or anxiety. Denies any recent memory loss.       Historical Information   Past Medical History:   Diagnosis Date    Anxiety     Chronic kidney disease, stage III (moderate) (HCC)     Depression     Elevated hemoglobin A1c     Fluid retention     GERD (gastroesophageal reflux disease) 30 years ago    Graves disease     Hyperlipidemia     Hypertension     Hypertension, essential     Hypothyroidism     Insomnia     Migraine     Osteoporosis     Prediabetes      Past Surgical History:   Procedure Laterality Date    THYROIDECTOMY       Social History   Social History     Substance and Sexual Activity   Alcohol Use Not Currently    Comment: rare     Social History     Substance and Sexual Activity   Drug Use Never     Social History     Tobacco Use   Smoking Status Former    Current packs/day: 1.00    Average packs/day: 1 pack/day for 53.2 years (53.2 ttl pk-yrs)    Types: Cigarettes    Start date: 1971  "  Smokeless Tobacco Never   Tobacco Comments    Smokes a pack a day     Family History   Problem Relation Age of Onset    Diabetes Mother     Parkinsonism Father     Diabetes Sister     Breast cancer Sister         bi-lateral masectomy     Dementia Sister         frontoemporal demantia, age 60     Diabetes Brother        Meds/Allergies       Current Outpatient Medications:     aspirin (ECOTRIN LOW STRENGTH) 81 mg EC tablet    levothyroxine 150 mcg tablet    metoprolol succinate (TOPROL-XL) 50 mg 24 hr tablet    rosuvastatin (CRESTOR) 20 MG tablet    sucralfate (CARAFATE) 1 g tablet    triamterene-hydrochlorothiazide (MAXZIDE-25) 37.5-25 mg per tablet    bisacodyl (DULCOLAX) 5 mg EC tablet    famotidine (PEPCID) 20 mg tablet    LORazepam (ATIVAN) 1 mg tablet    PARoxetine (PAXIL) 30 mg tablet    polyethylene glycol (GOLYTELY) 4000 mL solution    No Known Allergies        Objective     Blood pressure 122/68, temperature 98.6 °F (37 °C), temperature source Tympanic, height 5' 5\" (1.651 m), weight 91.6 kg (202 lb). Body mass index is 33.61 kg/m².     PHYSICAL EXAM:      General Appearance:   Alert, cooperative, no distress   HEENT:   Normocephalic, atraumatic, anicteric.     Neck:  Supple, symmetrical, trachea midline   Lungs:   Clear to auscultation bilaterally; no rales, rhonchi or wheezing; respirations unlabored    Heart::   Regular rate and rhythm; no murmur, rub, or gallop.   Abdomen:   Soft, non-tender, non-distended; normal bowel sounds; no masses, no organomegaly    Genitalia:   Deferred    Rectal:   Deferred    Extremities:  No cyanosis, clubbing or edema    Pulses:  2+ and symmetric    Skin:  No jaundice, rashes, or lesions    Lymph nodes:  No palpable cervical lymphadenopathy        Lab Results:     Lab Results   Component Value Date    WBC 11.09 (H) 11/23/2023    HGB 14.2 11/23/2023    HCT 41.4 11/23/2023    MCV 91 11/23/2023     11/23/2023       Lab Results   Component Value Date    K 3.5 01/03/2024 " "    01/03/2024    CO2 26 01/03/2024    BUN 16 01/03/2024    CREATININE 1.13 (H) 01/03/2024    CALCIUM 9.4 01/03/2024    AST 20 11/23/2023    ALT 12 11/23/2023    ALKPHOS 77 11/23/2023    EGFR 52 (L) 01/03/2024       No results found for: \"INR\", \"PROTIME\"      Radiology Results:   No results found.    Portions of the record may have been created with voice recognition software. Occasional wrong word or \"sound a like\" substitutions may have occurred due to the inherent limitations of voice recognition software. Read the chart carefully and recognize, using context, where substitutions have occurred.       "

## 2024-04-02 LAB
IGA SERPL-MCNC: 197 MG/DL (ref 70–320)
TTG IGA SER-ACNC: <1 U/ML

## 2024-04-03 NOTE — PROGRESS NOTES
Follow-up - Cardiology   Maria Dolores Francois 71 y o  female MRN: [id number]        Problems    Problem List Items Addressed This Visit    None  Visit Diagnoses     Multiple lung nodules on CT    -  Primary    Abnormal findings on diagnostic imaging of digestive system        Short of breath on exertion        Nonspecific abnormal electrocardiogram (ECG) (EKG)                Plan and discussion   Patient seen November 30, 2022  Recent CT scan October shows multiple small nodules quoted as “stable  She is still smoking  Another CT scan in 1 year  He is also seeing the Pulmonary group  She is going to have endoscopy because of vomiting if she does  lie flat  CT scan shows heavy calcification of coronaries  She is on Lipitor 20  LDL is 103  Read increase the Lipitor to 40 mg daily  We will get a calcium score  Also get a nuclear study  She has had severe grief  She has lost 3 sisters with in 1 year is about to lose her last sister  HPI: Maria Dolores Francois is a 71y o  year old female               Plan discussion patient seen November 30, 2021  Mr  saw her complaint was primarily shortness of breath  Since that time she has had a CT scan of her chest   She has small nodules is recommended that she have another CT scan 1 year  In addition, there is an abnormality consistent with a hiatal hernia and endoscopies recommended  Her pulmonary function studies are abnormal consistent with mild restrictive and mild obstructive disease  She had a stress echo  No evidence of ischemia  She is able exercise a little over 6 minutes  The plan therefore is to get a order a CT scan for 1 year  She says she will stop smoking      Also order a GI consult for endoscopy               HPI: Maria Dolores Francois is a 80y o  year old female      Reason for Consult / Principal Problem:  Short of breath and tachycardia     Patient has electrocardiogram that has variably shown left atrial or right atrial enlargement   She has a sinus rhythm   Heart rate almost 100 generally 90     She did have grave's disease and she had a the thyroid obliterated   She is on 150 micrograms of Synthroid     However, her TSH is normal     She has been treated for elevated lipids     She is on a statin     LDL is 92     Her A1c is 5 5     Remainder of her lab work is normal     She has shortness of breath with rather minimal exertion     For example running the sweeper she short of breath and has to stop   She did does not say she has any tightness in her chest     She does have family history coronary disease     Past medical history is unremarkable except for mild elevation of blood pressure high cholesterol     Her medications include Paxil as well as a statin and a diuretic for hypertension     She is      She has had at least 20 years pack years of smoking     She has a family history of high blood pressure coronary artery disease and diabetes as well as 2 sisters with dementia     Her review systems and other than her shortness of breath is unremarkable   She feels like she is significantly limited in what she can do  HPI: Abi Murray is [id number] y o  year old fem                 Plan and discussion  Patient seen October 26, 2021     We will check a Holter, echocardiogram and stress echo as well as pulmonary function studies  Also get a CT scan the she has more than a 20 pack-year history of smoking             Review of Systems   Constitutional: Negative  Respiratory: Negative  Cardiovascular: Negative  Psychiatric/Behavioral: Positive for sleep disturbance          Severe grief at this time with losing 3 sisters and about to lose the 4th         Past Medical History:   Diagnosis Date   • Anxiety    • Chronic kidney disease, stage III (moderate) ([facility] [address] )    • Depression    • Fluid retention    • Graves disease    • Hypertension, essential    • Hypothyroidism    • Insomnia    • Migraine    • Osteoporosis      Social History     Substance and Sexual Activity   Alcohol Use Yes    Comment: socially     Social History     Substance and Sexual Activity   Drug Use Never     Social History     Tobacco Use   Smoking Status Some Days   • Packs/day: 0 50   • Types: Cigarettes   Smokeless Tobacco Never       Allergies:  No Known Allergies    Medications:     Current Outpatient Medications:   •  aspirin (ECOTRIN LOW STRENGTH) 81 mg EC tablet, Take 81 mg by mouth daily, Disp: , Rfl:   •  atorvastatin (LIPITOR) 20 mg tablet, Take 20 mg by mouth daily, Disp: , Rfl: 0  •  levothyroxine 150 mcg tablet, Take 150 mcg by mouth daily, Disp: , Rfl:   •  LORazepam (ATIVAN) 1 mg tablet, Take 1 mg by mouth every 8 (eight) hours as needed for anxiety, Disp: , Rfl:   •  PARoxetine (PAXIL) 30 mg tablet, Take 30 mg by mouth every morning, Disp: , Rfl:   •  triamterene-hydrochlorothiazide (MAXZIDE-25) 37 5-25 mg per tablet, Take 1 tablet by mouth daily, Disp: , Rfl: 0  •  bisacodyl (DULCOLAX) 5 mg EC tablet, Take 2 tablets (10 mg total) by mouth once for 1 dose Take once at 4pm evening before the procedure, Disp: 2 tablet, Rfl: 0  •  polyethylene glycol (GOLYTELY) 4000 mL solution, Take 4,000 mL by mouth once for 1 dose, Disp: 4000 mL, Rfl: 0      Physical Exam  Constitutional:       Appearance: She is obese  Cardiovascular:      Rate and Rhythm: Normal rate and regular rhythm  Pulses: Normal pulses  Heart sounds: No murmur heard  Pulmonary:      Effort: Pulmonary effort is normal       Breath sounds: Normal breath sounds  Musculoskeletal:      Right lower leg: No edema  Left lower leg: No edema  Skin:     General: Skin is warm and dry  Neurological:      Mental Status: She is alert and oriented to person, place, and time             Laboratory Studies:  CMP:      Invalid input(s): ALBUMIN  NT-proBNP: No results found for: NTBNP   Coags:    Lipid Profile:   No results found for: CHOL  No results found for: HDL  No results found for: "[address]  No results found for: TRIG    Cardiac testing:     EKG reviewed personally:     No results found for this or any previous visit  No results found for this or any previous visit  No results found for this or any previous visit  No results found for this or any previous visit  Deanna Sky MD    Portions of the record may have been created with voice recognition software   Occasional wrong word or \"sound a like\" substitutions may have occurred due to the inherent limitations of voice recognition software   Read the chart carefully and recognize, using context, where substitutions have occurred    " Continue Regimen: CerVe hydrating cleanser Detail Level: Zone

## 2024-04-06 DIAGNOSIS — R10.13 EPIGASTRIC PAIN: ICD-10-CM

## 2024-04-08 RX ORDER — SUCRALFATE 1 G/1
1 TABLET ORAL 2 TIMES DAILY PRN
Qty: 30 TABLET | Refills: 2 | Status: SHIPPED | OUTPATIENT
Start: 2024-04-08

## 2024-04-15 DIAGNOSIS — R10.13 EPIGASTRIC PAIN: ICD-10-CM

## 2024-04-16 RX ORDER — OMEPRAZOLE 40 MG/1
40 CAPSULE, DELAYED RELEASE ORAL DAILY
Qty: 90 CAPSULE | Refills: 1 | Status: SHIPPED | OUTPATIENT
Start: 2024-04-16

## 2024-04-23 ENCOUNTER — TRANSCRIBE ORDERS (OUTPATIENT)
Dept: GASTROENTEROLOGY | Facility: CLINIC | Age: 74
End: 2024-04-23

## 2024-05-23 ENCOUNTER — TELEPHONE (OUTPATIENT)
Age: 74
End: 2024-05-23

## 2024-05-23 NOTE — TELEPHONE ENCOUNTER
Caller: Abi Murray     Doctor: Dr. Umana     Reason for call: patient would like her lab scripts for blood work mailed to [place]. She goes to [facility] for blood work. Please advise.     Call back#: [phone]

## 2024-06-18 ENCOUNTER — APPOINTMENT (OUTPATIENT)
Dept: LAB | Facility: CLINIC | Age: 74
End: 2024-06-18
Payer: MEDICARE

## 2024-06-18 DIAGNOSIS — E78.5 HYPERLIPIDEMIA, UNSPECIFIED HYPERLIPIDEMIA TYPE: ICD-10-CM

## 2024-06-18 DIAGNOSIS — E87.6 HYPOKALEMIA: ICD-10-CM

## 2024-06-18 DIAGNOSIS — R06.09 DOE (DYSPNEA ON EXERTION): ICD-10-CM

## 2024-06-18 DIAGNOSIS — E89.0 POSTABLATIVE HYPOTHYROIDISM: ICD-10-CM

## 2024-06-18 DIAGNOSIS — I25.10 CORONARY ARTERY DISEASE INVOLVING NATIVE CORONARY ARTERY OF NATIVE HEART WITHOUT ANGINA PECTORIS: ICD-10-CM

## 2024-06-18 LAB
ANION GAP SERPL CALCULATED.3IONS-SCNC: 12 MMOL/L (ref 4–13)
BNP SERPL-MCNC: 83 PG/ML (ref 0–100)
BUN SERPL-MCNC: 14 MG/DL (ref 5–25)
CALCIUM SERPL-MCNC: 9.6 MG/DL (ref 8.4–10.2)
CHLORIDE SERPL-SCNC: 101 MMOL/L (ref 96–108)
CHOLEST SERPL-MCNC: 161 MG/DL
CO2 SERPL-SCNC: 26 MMOL/L (ref 21–32)
CREAT SERPL-MCNC: 1.04 MG/DL (ref 0.6–1.3)
GFR SERPL CREATININE-BSD FRML MDRD: 53 ML/MIN/1.73SQ M
GLUCOSE P FAST SERPL-MCNC: 120 MG/DL (ref 65–99)
HDLC SERPL-MCNC: 56 MG/DL
LDLC SERPL CALC-MCNC: 65 MG/DL (ref 0–100)
POTASSIUM SERPL-SCNC: 3.4 MMOL/L (ref 3.5–5.3)
SODIUM SERPL-SCNC: 139 MMOL/L (ref 135–147)
T4 FREE SERPL-MCNC: 1.32 NG/DL (ref 0.61–1.12)
TRIGL SERPL-MCNC: 200 MG/DL
TSH SERPL DL<=0.05 MIU/L-ACNC: 0.07 UIU/ML (ref 0.45–4.5)

## 2024-06-18 PROCEDURE — 83880 ASSAY OF NATRIURETIC PEPTIDE: CPT

## 2024-06-18 PROCEDURE — 84439 ASSAY OF FREE THYROXINE: CPT

## 2024-06-18 PROCEDURE — 36415 COLL VENOUS BLD VENIPUNCTURE: CPT

## 2024-06-18 PROCEDURE — 80048 BASIC METABOLIC PNL TOTAL CA: CPT

## 2024-06-18 PROCEDURE — 80061 LIPID PANEL: CPT

## 2024-06-18 PROCEDURE — 84443 ASSAY THYROID STIM HORMONE: CPT

## 2024-06-19 ENCOUNTER — OFFICE VISIT (OUTPATIENT)
Dept: CARDIOLOGY CLINIC | Facility: CLINIC | Age: 74
End: 2024-06-19
Payer: MEDICARE

## 2024-06-19 VITALS
HEIGHT: 65 IN | SYSTOLIC BLOOD PRESSURE: 118 MMHG | OXYGEN SATURATION: 97 % | DIASTOLIC BLOOD PRESSURE: 68 MMHG | BODY MASS INDEX: 33.47 KG/M2 | WEIGHT: 200.9 LBS | HEART RATE: 70 BPM

## 2024-06-19 DIAGNOSIS — E78.5 HYPERLIPIDEMIA, UNSPECIFIED HYPERLIPIDEMIA TYPE: Primary | ICD-10-CM

## 2024-06-19 DIAGNOSIS — R00.0 TACHYCARDIA: ICD-10-CM

## 2024-06-19 PROCEDURE — 99214 OFFICE O/P EST MOD 30 MIN: CPT | Performed by: INTERNAL MEDICINE

## 2024-06-19 RX ORDER — METOPROLOL SUCCINATE 25 MG/1
25 TABLET, EXTENDED RELEASE ORAL DAILY
Qty: 30 TABLET | Refills: 3 | Status: SHIPPED | OUTPATIENT
Start: 2024-06-19

## 2024-06-19 NOTE — LETTER
June 19, 2024     Aisha Anders MD  [address]  [address]  [place] [address]    Patient: Abi Murray   YOB: 1950   Date of Visit: 6/19/2024       Dear Dr. Anders:    Thank you for referring Abi Murray to me for evaluation. Below are my notes for this consultation.    If you have questions, please do not hesitate to call me. I look forward to following your patient along with you.         Sincerely,        Arcelia Umana MD        CC: No Recipients    Arcelia Umana MD  6/19/2024 12:16 PM  Sign when Signing Visit  Cardiology   Abi Murray 73 y.o. female MRN: [id number]   Encounter: [id number]      Assessment/Plan:    >> Chest pain  >> Dyspnea on exertion  >> Hypokalemia  >> Coronary artery disease: CACS: 568  >> Dyslipidemia  >> Former 60 pack year smoking  >> Pre diabetes  >> Systolic heart murmur: Likely aortic sclerosis, S2 was preserved.  >> Hyperthyroidism: Noted on last labs.  Being managed by her primary care physician.    Plan:    -Continue current medication regimen and repeat lipid profile with direct LDL, may need to uptitrate rosuvastatin or add Zetia.  -Cardiac BNP was unremarkable  -Continue baby aspirin  -Will decrease metoprolol succinate to 25 mg/day and see if this helps with her fatigue  -Continue Crestor to 20 mg daily,  target LDL is less than 55, will repeat lipid panel prior to next visit and if LDL is not at target we will add Zetia  -Counseled on diet and exercise  -Consider referral to pulmonary medicine if her dyspnea on exertion/fatigue persists.  She had mild obstructive airflow disease on PFTs done in 2021.    6/19/2024: Feeling fatigued, does not feel like doing anything all day.  Is tired all the time.  Denies any chest pain or palpitations.  These have completely resolved.  Her echo stress test was unremarkable, other than reduced exercise capacity-although this was on beta-blockers, and her full echocardiogram was unremarkable.    2/28/24: Feeling  "fatigued otherwise, overall doing okay.  Denies any chest pain.  Although her exercise capacity was reduced on stress test, she did reach her target heart rate and I reviewed the echo images personally, no wall motion abnormality seen and cardiac function augments appropriately with stress.    CC: Chest pain    HPI  73 y.o. female to establish care and for evaluation of chest pain.  She was in the emergency room yesterday for chest pain that began while she was visiting her sister who was admitted to the hospital with bloody emesis.  She had 2 sets of troponin that were negative.  He had stabbing chest pain that began on the left side of her chest and spread to both arms.  She did not have any associated nausea or vomiting.    She has had on and off chest pain for several months.  She had a nuclear stress test (pharmacological), in January that showed \"ischemia in the basal and mid LAD\".    She has dyspnea on exertion and gets winded even doing basic activities.    She has been eating an unhealthy diet and lots of junk food.    She did quit smoking 1 year ago and has been able to stay away from cigarettes since that time        The patient deniespalpitations, orthopnea, PND, pedal edema, syncope, presyncope, diaphoresis, nausea/vomiting       The 10-year ASCVD risk score (Carmen DK, et al., 2019) is: 9.6%    Values used to calculate the score:      Age: 73 years      Sex: Female      Is Non- : No      Diabetic: No      Tobacco smoker: No      Systolic Blood Pressure: 118 mmHg      Is BP treated: No      HDL Cholesterol: 56 mg/dL      Total Cholesterol: 161 mg/dL            Physical exam  Objective  Vitals: Blood pressure 118/68, pulse 70, height 5' 5\" (1.651 m), weight 91.1 kg (200 lb 14.4 oz), SpO2 97%.    General:  AO x3, no acute distress  Cardiac:  S1-S2 normal, 2 /6 systolic murmur in the right upper sternal border no rubs or gallops, JVP: normal  Lungs:  Clear to auscultation " bilaterally, no wheezing or crackles.  Abdomen:  Soft, nontender, nondistended.  Extremities:  Warm, well perfused, pulses palpable, no ulcers or rashes. Edema:absent  Neuro: Grossly nonfocal        ======================================================  TREADMILL STRESS  No results found for this or any previous visit.     ----------------------------------------------------------------------------------------------  NUCLEAR STRESS TEST: No results found for this or any previous visit.    Results for orders placed during the hospital encounter of 01/04/23    NM myocardial perfusion spect (rx stress and/or rest)    Interpretation Summary  •  Stress ECG: No ST deviation is noted. The ECG was negative for ischemia. The stress ECG is negative for ischemia after vasodilation and low level exercise, without reproduction of symptoms.  •  Perfusion: There is a left ventricular perfusion defect that is medium in size with mild reduction in uptake present in the basal to mid anterior location(s) that is reversible.  •  Stress Function: Left ventricular function post-stress is normal. Post-stress ejection fraction is 77 %.  •  Stress Combined Conclusion: The ECG and SPECT imaging portions of the stress study are discordant but are nonetheless concerning for inducible myocardial ischemia given the known limited sensitivity of stress electrocardiography. Left ventricular perfusion is abnormal. There is probable mild ischemia.in the basal and mid segments of the anterior wall.      --------------------------------------------------------------------------------  CATH:  No results found for this or any previous visit.    --------------------------------------------------------------------------------  ECHO:   No results found for this or any previous visit.    No results found for this or any previous visit.    --------------------------------------------------------------------------------  HOLTER  No results found for this or  "any previous visit.    --------------------------------------------------------------------------------  CAROTIDS  Results for orders placed during the hospital encounter of 02/28/19    VAS carotid complete study    Narrative  [facility] REPORT  CLINICAL:  Indications:  Visual disturbance.  Patient experienced \"kaleidoscopic tonio\" in her field of vision.  These  episodes happened several times between the months of November and December but  have since the new year.  Risk Factors  The patient has history of HTN, HLD and smoking (current) 0.75 ppd.  Clinical  Right Pressure:  130/ mm Hg, Left Pressure:  122/ mm Hg.    FINDINGS:    Right        Impression  PSV  EDV (cm/s)  Direction of Flow  Ratio  Dist. ICA                 57          25                      0.60  Mid. ICA                  79          34                      0.84  Prox. ICA    1 - 49%      72          26                      0.77  Dist CCA                  66          32  Mid CCA                   94          29                      0.99  Prox CCA                  96          26  Ext Carotid               95          24                      1.01  Prox Vert                 36          15  Antegrade  Subclavian                70           0    Left         Impression  PSV  EDV (cm/s)  Direction of Flow  Ratio  Dist. ICA                 45          20                      0.49  Mid. ICA                  71          31                      0.79  Prox. ICA    1 - 49%      80          30                      0.89  Dist CCA                  85          32  Mid CCA                   91          31                      0.91  Prox CCA                 100          28  Ext Carotid               80          15                      0.89  Prox Vert                 36          12  Antegrade  Subclavian               200           0        CONCLUSION:    Impression  RIGHT:  There is <50% stenosis noted in the internal carotid artery. Plaque is  homogenous " and smooth.  Vertebral artery flow is antegrade. There is no significant subclavian artery  disease.  LEFT:  There is <50% stenosis noted in the internal carotid artery. Plaque is  homogenous and smooth.  Vertebral artery flow is antegrade. There is no significant subclavian artery  disease.    Internal carotid artery stenosis determination by consensus criteria from:  SHAREE Lizarraga, et.al. Carotid Artery Stenosis: Gray-Scale and Doppler US Diagnosis  - Society of Radiologists in Ultrasound Consensus Conference, Radiology 2003;  [id number].    SIGNATURE:  Electronically Signed by: ANDREA NEAL on 2019-02-28 03:52:38 PM       Diagnoses and all orders for this visit:    Hyperlipidemia, unspecified hyperlipidemia type  -     Lipid Panel With Direct LDL; Future    Tachycardia  -     metoprolol succinate (TOPROL-XL) 25 mg 24 hr tablet; Take 1 tablet (25 mg total) by mouth daily       ======================================================          Review of Systems  ROS as noted above, otherwise 12 point review of systems was performed and is negative.     Historical Information  Past Medical History:   Diagnosis Date   • Anxiety    • Chronic kidney disease, stage III (moderate) (HCC)    • Depression    • Elevated hemoglobin A1c    • Fluid retention    • GERD (gastroesophageal reflux disease) 30 years ago   • Graves disease    • Hyperlipidemia    • Hypertension    • Hypertension, essential    • Hypothyroidism    • Insomnia    • Migraine    • Osteoporosis    • Prediabetes      Past Surgical History:   Procedure Laterality Date   • THYROIDECTOMY       Social History     Substance and Sexual Activity   Alcohol Use Not Currently    Comment: rare     Social History     Substance and Sexual Activity   Drug Use Never     Social History     Tobacco Use   Smoking Status Former   • Current packs/day: 1.00   • Average packs/day: 1 pack/day for 53.5 years (53.5 ttl pk-yrs)   • Types: Cigarettes   • Start date: 1971   Smokeless  "Tobacco Never   Tobacco Comments    Smokes a pack a day     Family History   Problem Relation Age of Onset   • Diabetes Mother    • Parkinsonism Father    • Diabetes Sister    • Breast cancer Sister         bi-lateral masectomy    • Dementia Sister         frontoemporal demantia, age 60    • Diabetes Brother        Meds/Allergies  Hospital Medications:   No current facility-administered medications for this visit.     Home Medications: Not in a hospital admission.      No Known Allergies      Portions of the record may have been created with voice recognition software.  Occasional wrong words or \"sound a like\" substitutions may have occurred due to the inherent limitations of voice recognition software.  Read the chart carefully and recognize, using context, where substitutions have occurred.        I spent > 35 mins examining and interviewing the patient, coordinating care, reviewing the chart, reviewing testing and writing the note.                   "

## 2024-06-19 NOTE — PROGRESS NOTES
Cardiology   Abi Murray 73 y.o. female MRN: [id number]   Encounter: [id number]      Assessment/Plan:    >> Chest pain  >> Dyspnea on exertion  >> Hypokalemia  >> Coronary artery disease: CACS: 568  >> Dyslipidemia  >> Former 60 pack year smoking  >> Pre diabetes  >> Systolic heart murmur: Likely aortic sclerosis, S2 was preserved.  >> Hyperthyroidism: Noted on last labs.  Being managed by her primary care physician.    Plan:    -Continue current medication regimen and repeat lipid profile with direct LDL, may need to uptitrate rosuvastatin or add Zetia.  -Cardiac BNP was unremarkable  -Continue baby aspirin  -Will decrease metoprolol succinate to 25 mg/day and see if this helps with her fatigue  -Continue Crestor to 20 mg daily,  target LDL is less than 55, will repeat lipid panel prior to next visit and if LDL is not at target we will add Zetia  -Counseled on diet and exercise  -Consider referral to pulmonary medicine if her dyspnea on exertion/fatigue persists.  She had mild obstructive airflow disease on PFTs done in 2021.    6/19/2024: Feeling fatigued, does not feel like doing anything all day.  Is tired all the time.  Denies any chest pain or palpitations.  These have completely resolved.  Her echo stress test was unremarkable, other than reduced exercise capacity-although this was on beta-blockers, and her full echocardiogram was unremarkable.    2/28/24: Feeling fatigued otherwise, overall doing okay.  Denies any chest pain.  Although her exercise capacity was reduced on stress test, she did reach her target heart rate and I reviewed the echo images personally, no wall motion abnormality seen and cardiac function augments appropriately with stress.    CC: Chest pain    HPI  73 y.o. female to establish care and for evaluation of chest pain.  She was in the emergency room yesterday for chest pain that began while she was visiting her sister who was admitted to the hospital with bloody emesis.  She had 2 sets  "of troponin that were negative.  He had stabbing chest pain that began on the left side of her chest and spread to both arms.  She did not have any associated nausea or vomiting.    She has had on and off chest pain for several months.  She had a nuclear stress test (pharmacological), in January that showed \"ischemia in the basal and mid LAD\".    She has dyspnea on exertion and gets winded even doing basic activities.    She has been eating an unhealthy diet and lots of junk food.    She did quit smoking 1 year ago and has been able to stay away from cigarettes since that time        The patient deniespalpitations, orthopnea, PND, pedal edema, syncope, presyncope, diaphoresis, nausea/vomiting       The 10-year ASCVD risk score (Derrick LANDRY, et al., 2019) is: 9.6%    Values used to calculate the score:      Age: 73 years      Sex: Female      Is Non- : No      Diabetic: No      Tobacco smoker: No      Systolic Blood Pressure: 118 mmHg      Is BP treated: No      HDL Cholesterol: 56 mg/dL      Total Cholesterol: 161 mg/dL            Physical exam  Objective   Vitals: Blood pressure 118/68, pulse 70, height 5' 5\" (1.651 m), weight 91.1 kg (200 lb 14.4 oz), SpO2 97%.    General:  AO x3, no acute distress  Cardiac:  S1-S2 normal, 2 /6 systolic murmur in the right upper sternal border no rubs or gallops, JVP: normal  Lungs:  Clear to auscultation bilaterally, no wheezing or crackles.  Abdomen:  Soft, nontender, nondistended.  Extremities:  Warm, well perfused, pulses palpable, no ulcers or rashes. Edema:absent  Neuro: Grossly nonfocal        ======================================================  TREADMILL STRESS  No results found for this or any previous visit.     ----------------------------------------------------------------------------------------------  NUCLEAR STRESS TEST: No results found for this or any previous visit.    Results for orders placed during the hospital encounter of " "01/04/23    NM myocardial perfusion spect (rx stress and/or rest)    Interpretation Summary    Stress ECG: No ST deviation is noted. The ECG was negative for ischemia. The stress ECG is negative for ischemia after vasodilation and low level exercise, without reproduction of symptoms.    Perfusion: There is a left ventricular perfusion defect that is medium in size with mild reduction in uptake present in the basal to mid anterior location(s) that is reversible.    Stress Function: Left ventricular function post-stress is normal. Post-stress ejection fraction is 77 %.    Stress Combined Conclusion: The ECG and SPECT imaging portions of the stress study are discordant but are nonetheless concerning for inducible myocardial ischemia given the known limited sensitivity of stress electrocardiography. Left ventricular perfusion is abnormal. There is probable mild ischemia.in the basal and mid segments of the anterior wall.      --------------------------------------------------------------------------------  CATH:  No results found for this or any previous visit.    --------------------------------------------------------------------------------  ECHO:   No results found for this or any previous visit.    No results found for this or any previous visit.    --------------------------------------------------------------------------------  HOLTER  No results found for this or any previous visit.    --------------------------------------------------------------------------------  CAROTIDS  Results for orders placed during the hospital encounter of 02/28/19    VAS carotid complete study    Narrative  [facility] REPORT  CLINICAL:  Indications:  Visual disturbance.  Patient experienced \"kaleidoscopic tonio\" in her field of vision.  These  episodes happened several times between the months of November and December but  have since the new year.  Risk Factors  The patient has history of HTN, HLD and smoking (current) 0.75 " ppd.  Clinical  Right Pressure:  130/ mm Hg, Left Pressure:  122/ mm Hg.    FINDINGS:    Right        Impression  PSV  EDV (cm/s)  Direction of Flow  Ratio  Dist. ICA                 57          25                      0.60  Mid. ICA                  79          34                      0.84  Prox. ICA    1 - 49%      72          26                      0.77  Dist CCA                  66          32  Mid CCA                   94          29                      0.99  Prox CCA                  96          26  Ext Carotid               95          24                      1.01  Prox Vert                 36          15  Antegrade  Subclavian                70           0    Left         Impression  PSV  EDV (cm/s)  Direction of Flow  Ratio  Dist. ICA                 45          20                      0.49  Mid. ICA                  71          31                      0.79  Prox. ICA    1 - 49%      80          30                      0.89  Dist CCA                  85          32  Mid CCA                   91          31                      0.91  Prox CCA                 100          28  Ext Carotid               80          15                      0.89  Prox Vert                 36          12  Antegrade  Subclavian               200           0        CONCLUSION:    Impression  RIGHT:  There is <50% stenosis noted in the internal carotid artery. Plaque is  homogenous and smooth.  Vertebral artery flow is antegrade. There is no significant subclavian artery  disease.  LEFT:  There is <50% stenosis noted in the internal carotid artery. Plaque is  homogenous and smooth.  Vertebral artery flow is antegrade. There is no significant subclavian artery  disease.    Internal carotid artery stenosis determination by consensus criteria from:  WESTON Lizarraga., et.al. Carotid Artery Stenosis: Gray-Scale and Doppler US Diagnosis  - Society of Radiologists in Ultrasound Consensus Conference, Radiology  2003;  [id number].    SIGNATURE:  Electronically Signed by: ANDREA NEAL on 2019-02-28 03:52:38 PM       Diagnoses and all orders for this visit:    Hyperlipidemia, unspecified hyperlipidemia type  -     Lipid Panel With Direct LDL; Future    Tachycardia  -     metoprolol succinate (TOPROL-XL) 25 mg 24 hr tablet; Take 1 tablet (25 mg total) by mouth daily       ======================================================          Review of Systems  ROS as noted above, otherwise 12 point review of systems was performed and is negative.     Historical Information   Past Medical History:   Diagnosis Date    Anxiety     Chronic kidney disease, stage III (moderate) (HCC)     Depression     Elevated hemoglobin A1c     Fluid retention     GERD (gastroesophageal reflux disease) 30 years ago    Graves disease     Hyperlipidemia     Hypertension     Hypertension, essential     Hypothyroidism     Insomnia     Migraine     Osteoporosis     Prediabetes      Past Surgical History:   Procedure Laterality Date    THYROIDECTOMY       Social History     Substance and Sexual Activity   Alcohol Use Not Currently    Comment: rare     Social History     Substance and Sexual Activity   Drug Use Never     Social History     Tobacco Use   Smoking Status Former    Current packs/day: 1.00    Average packs/day: 1 pack/day for 53.5 years (53.5 ttl pk-yrs)    Types: Cigarettes    Start date: 1971   Smokeless Tobacco Never   Tobacco Comments    Smokes a pack a day     Family History   Problem Relation Age of Onset    Diabetes Mother     Parkinsonism Father     Diabetes Sister     Breast cancer Sister         bi-lateral masectomy     Dementia Sister         frontoemporal demantia, age 60     Diabetes Brother        Meds/Allergies   Hospital Medications:   No current facility-administered medications for this visit.     Home Medications: Not in a hospital admission.      No Known Allergies      Portions of the record may have been created with voice  "recognition software.  Occasional wrong words or \"sound a like\" substitutions may have occurred due to the inherent limitations of voice recognition software.  Read the chart carefully and recognize, using context, where substitutions have occurred.        I spent > 35 mins examining and interviewing the patient, coordinating care, reviewing the chart, reviewing testing and writing the note.                   "

## 2024-07-17 DIAGNOSIS — E78.5 HYPERLIPIDEMIA, UNSPECIFIED HYPERLIPIDEMIA TYPE: ICD-10-CM

## 2024-07-17 RX ORDER — ROSUVASTATIN CALCIUM 20 MG/1
20 TABLET, COATED ORAL DAILY
Qty: 90 TABLET | Refills: 1 | Status: SHIPPED | OUTPATIENT
Start: 2024-07-17

## 2024-07-29 DIAGNOSIS — R00.0 TACHYCARDIA: ICD-10-CM

## 2024-07-30 RX ORDER — METOPROLOL SUCCINATE 25 MG/1
25 TABLET, EXTENDED RELEASE ORAL DAILY
Qty: 100 TABLET | Refills: 1 | Status: SHIPPED | OUTPATIENT
Start: 2024-07-30

## 2024-07-31 ENCOUNTER — TELEPHONE (OUTPATIENT)
Age: 74
End: 2024-07-31

## 2024-07-31 NOTE — TELEPHONE ENCOUNTER
Contacted pt off [facility] wait list in regards to verify needs of service and offer an appt. LVM for pt to contact intake dept for scheduling.        First Attempt to schedule.

## 2024-10-18 DIAGNOSIS — R10.13 EPIGASTRIC PAIN: ICD-10-CM

## 2024-10-18 RX ORDER — OMEPRAZOLE 40 MG/1
40 CAPSULE, DELAYED RELEASE ORAL DAILY
Qty: 90 CAPSULE | Refills: 1 | Status: SHIPPED | OUTPATIENT
Start: 2024-10-18

## 2025-01-15 ENCOUNTER — APPOINTMENT (OUTPATIENT)
Dept: LAB | Facility: CLINIC | Age: 75
End: 2025-01-15
Payer: MEDICARE

## 2025-01-15 DIAGNOSIS — R93.1 ELEVATED CORONARY ARTERY CALCIUM SCORE: ICD-10-CM

## 2025-01-15 DIAGNOSIS — R73.03 PREDIABETES: ICD-10-CM

## 2025-01-15 DIAGNOSIS — E78.5 HYPERLIPIDEMIA, UNSPECIFIED HYPERLIPIDEMIA TYPE: ICD-10-CM

## 2025-01-15 DIAGNOSIS — I10 ESSENTIAL HYPERTENSION, MALIGNANT: ICD-10-CM

## 2025-01-15 LAB
ANION GAP SERPL CALCULATED.3IONS-SCNC: 7 MMOL/L (ref 4–13)
BASOPHILS # BLD AUTO: 0.09 THOUSANDS/ΜL (ref 0–0.1)
BASOPHILS NFR BLD AUTO: 1 % (ref 0–1)
BUN SERPL-MCNC: 14 MG/DL (ref 5–25)
CALCIUM SERPL-MCNC: 9.9 MG/DL (ref 8.4–10.2)
CHLORIDE SERPL-SCNC: 102 MMOL/L (ref 96–108)
CHOLEST SERPL-MCNC: 143 MG/DL (ref ?–200)
CO2 SERPL-SCNC: 31 MMOL/L (ref 21–32)
CREAT SERPL-MCNC: 1.13 MG/DL (ref 0.6–1.3)
EOSINOPHIL # BLD AUTO: 0.22 THOUSAND/ΜL (ref 0–0.61)
EOSINOPHIL NFR BLD AUTO: 3 % (ref 0–6)
ERYTHROCYTE [DISTWIDTH] IN BLOOD BY AUTOMATED COUNT: 13.7 % (ref 11.6–15.1)
EST. AVERAGE GLUCOSE BLD GHB EST-MCNC: 134 MG/DL
GFR SERPL CREATININE-BSD FRML MDRD: 48 ML/MIN/1.73SQ M
GLUCOSE P FAST SERPL-MCNC: 137 MG/DL (ref 65–99)
HBA1C MFR BLD: 6.3 %
HCT VFR BLD AUTO: 39.2 % (ref 34.8–46.1)
HDLC SERPL-MCNC: 54 MG/DL
HGB BLD-MCNC: 12.7 G/DL (ref 11.5–15.4)
IMM GRANULOCYTES # BLD AUTO: 0.02 THOUSAND/UL (ref 0–0.2)
IMM GRANULOCYTES NFR BLD AUTO: 0 % (ref 0–2)
LDLC SERPL CALC-MCNC: 64 MG/DL (ref 0–100)
LYMPHOCYTES # BLD AUTO: 2.44 THOUSANDS/ΜL (ref 0.6–4.47)
LYMPHOCYTES NFR BLD AUTO: 34 % (ref 14–44)
MCH RBC QN AUTO: 29.9 PG (ref 26.8–34.3)
MCHC RBC AUTO-ENTMCNC: 32.4 G/DL (ref 31.4–37.4)
MCV RBC AUTO: 92 FL (ref 82–98)
MONOCYTES # BLD AUTO: 0.69 THOUSAND/ΜL (ref 0.17–1.22)
MONOCYTES NFR BLD AUTO: 10 % (ref 4–12)
NEUTROPHILS # BLD AUTO: 3.63 THOUSANDS/ΜL (ref 1.85–7.62)
NEUTS SEG NFR BLD AUTO: 52 % (ref 43–75)
NRBC BLD AUTO-RTO: 0 /100 WBCS
PLATELET # BLD AUTO: 343 THOUSANDS/UL (ref 149–390)
PMV BLD AUTO: 9.9 FL (ref 8.9–12.7)
POTASSIUM SERPL-SCNC: 3.9 MMOL/L (ref 3.5–5.3)
RBC # BLD AUTO: 4.25 MILLION/UL (ref 3.81–5.12)
SODIUM SERPL-SCNC: 140 MMOL/L (ref 135–147)
TRIGL SERPL-MCNC: 126 MG/DL (ref ?–150)
WBC # BLD AUTO: 7.09 THOUSAND/UL (ref 4.31–10.16)

## 2025-01-15 PROCEDURE — 80061 LIPID PANEL: CPT

## 2025-01-15 PROCEDURE — 83036 HEMOGLOBIN GLYCOSYLATED A1C: CPT

## 2025-01-15 PROCEDURE — 80048 BASIC METABOLIC PNL TOTAL CA: CPT

## 2025-01-15 PROCEDURE — 36415 COLL VENOUS BLD VENIPUNCTURE: CPT

## 2025-01-15 PROCEDURE — 85025 COMPLETE CBC W/AUTO DIFF WBC: CPT

## 2025-01-16 RX ORDER — ROSUVASTATIN CALCIUM 20 MG/1
20 TABLET, COATED ORAL DAILY
Qty: 90 TABLET | Refills: 1 | Status: SHIPPED | OUTPATIENT
Start: 2025-01-16

## 2025-01-27 ENCOUNTER — APPOINTMENT (OUTPATIENT)
Dept: LAB | Facility: CLINIC | Age: 75
End: 2025-01-27
Payer: MEDICARE

## 2025-01-27 DIAGNOSIS — E89.0 POSTSURGICAL HYPOTHYROIDISM: ICD-10-CM

## 2025-01-27 LAB
T4 FREE SERPL-MCNC: 0.94 NG/DL (ref 0.61–1.12)
TSH SERPL DL<=0.05 MIU/L-ACNC: 0.64 UIU/ML (ref 0.45–4.5)

## 2025-01-27 PROCEDURE — 84443 ASSAY THYROID STIM HORMONE: CPT

## 2025-01-27 PROCEDURE — 84439 ASSAY OF FREE THYROXINE: CPT

## 2025-01-27 PROCEDURE — 36415 COLL VENOUS BLD VENIPUNCTURE: CPT

## 2025-01-28 ENCOUNTER — TELEPHONE (OUTPATIENT)
Dept: CARDIOLOGY CLINIC | Facility: CLINIC | Age: 75
End: 2025-01-28

## 2025-01-28 NOTE — TELEPHONE ENCOUNTER
I called the patient and l/m regarding her recent lipid panel. Also, reminded her of her follow up in Feb.

## 2025-01-30 DIAGNOSIS — R00.0 TACHYCARDIA: ICD-10-CM

## 2025-01-30 RX ORDER — METOPROLOL SUCCINATE 25 MG/1
25 TABLET, EXTENDED RELEASE ORAL DAILY
Qty: 90 TABLET | Refills: 1 | Status: SHIPPED | OUTPATIENT
Start: 2025-01-30

## 2025-02-13 ENCOUNTER — OFFICE VISIT (OUTPATIENT)
Dept: CARDIOLOGY CLINIC | Facility: CLINIC | Age: 75
End: 2025-02-13
Payer: MEDICARE

## 2025-02-13 VITALS
WEIGHT: 199 LBS | BODY MASS INDEX: 33.15 KG/M2 | SYSTOLIC BLOOD PRESSURE: 124 MMHG | HEART RATE: 72 BPM | OXYGEN SATURATION: 98 % | HEIGHT: 65 IN | DIASTOLIC BLOOD PRESSURE: 68 MMHG

## 2025-02-13 DIAGNOSIS — E78.5 HYPERLIPIDEMIA, UNSPECIFIED HYPERLIPIDEMIA TYPE: ICD-10-CM

## 2025-02-13 DIAGNOSIS — R00.0 TACHYCARDIA: Primary | ICD-10-CM

## 2025-02-13 DIAGNOSIS — E66.01 OBESITY, MORBID (HCC): ICD-10-CM

## 2025-02-13 DIAGNOSIS — N18.31 STAGE 3A CHRONIC KIDNEY DISEASE (HCC): ICD-10-CM

## 2025-02-13 PROCEDURE — 99214 OFFICE O/P EST MOD 30 MIN: CPT | Performed by: INTERNAL MEDICINE

## 2025-02-13 RX ORDER — EZETIMIBE 10 MG/1
5 TABLET ORAL DAILY
Qty: 30 TABLET | Refills: 3 | Status: SHIPPED | OUTPATIENT
Start: 2025-02-13

## 2025-02-13 NOTE — PROGRESS NOTES
Cardiology   Abi Murray 74 y.o. female MRN: [id number]   Encounter: [id number]      Assessment:    >> Chest pain  >> Dyspnea on exertion  >> Hypokalemia  >> Coronary artery disease: CACS: 568  >> Dyslipidemia  >> Former 60 pack year smoking  >> Pre diabetes  >> Systolic heart murmur: Likely aortic sclerosis, S2 was preserved.  >> Hyperthyroidism: Noted on last labs.  Being managed by her primary care physician.    Plan:    -Continue current medication regimen and repeat lipid profile with direct LDL,  -Cardiac BNP was unremarkable  -Continue baby aspirin, add Zetia 5 mg daily.  Repeat lipid panel in 3 months, target LDL less than 55.  -Will decrease metoprolol succinate to 25 mg/day and see if this helps with her fatigue  -Continue Crestor to 20 mg daily  -Counseled on diet and exercise  -Consider referral to pulmonary medicine if her dyspnea on exertion/fatigue persists.  She had mild obstructive airflow disease on PFTs done in 2021.  -Consider cardiac PET if shortness of breath worsens  -Return to office in 6 months            2/13/2025: No complaints, still has mild shortness of breath while vacuuming or exerting herself, overall feels well.  Recent blood work showed LDL of 65.    6/19/2024: Feeling fatigued, does not feel like doing anything all day.  Is tired all the time.  Denies any chest pain or palpitations.  These have completely resolved.  Her echo stress test was unremarkable, other than reduced exercise capacity-although this was on beta-blockers, and her full echocardiogram was unremarkable.    2/28/24: Feeling fatigued otherwise, overall doing okay.  Denies any chest pain.  Although her exercise capacity was reduced on stress test, she did reach her target heart rate and I reviewed the echo images personally, no wall motion abnormality seen and cardiac function augments appropriately with stress.    CC: Chest pain    HPI  74 y.o. female to establish care and for evaluation of chest pain.  She was in  "the emergency room yesterday for chest pain that began while she was visiting her sister who was admitted to the hospital with bloody emesis.  She had 2 sets of troponin that were negative.  He had stabbing chest pain that began on the left side of her chest and spread to both arms.  She did not have any associated nausea or vomiting.    She has had on and off chest pain for several months.  She had a nuclear stress test (pharmacological), in January that showed \"ischemia in the basal and mid LAD\".    She has dyspnea on exertion and gets winded even doing basic activities.    She has been eating an unhealthy diet and lots of junk food.    She did quit smoking 1 year ago and has been able to stay away from cigarettes since that time        The patient deniespalpitations, orthopnea, PND, pedal edema, syncope, presyncope, diaphoresis, nausea/vomiting       The 10-year ASCVD risk score (Derrick LANDRY, et al., 2019) is: 15.4%    Values used to calculate the score:      Age: 74 years      Sex: Female      Is Non- : No      Diabetic: No      Tobacco smoker: No      Systolic Blood Pressure: 124 mmHg      Is BP treated: Yes      HDL Cholesterol: 54 mg/dL      Total Cholesterol: 143 mg/dL            Physical exam  Objective   Vitals: Blood pressure 124/68, pulse 72, height 5' 5\" (1.651 m), weight 90.3 kg (199 lb), SpO2 98%.    General:  AO x3, no acute distress  Cardiac:  S1-S2 normal, 2 /6 systolic murmur in the right upper sternal border no rubs or gallops, JVP: normal  Lungs:  Clear to auscultation bilaterally, no wheezing or crackles.  Abdomen:  Soft, nontender, nondistended.  Extremities:  Warm, well perfused, pulses palpable, no ulcers or rashes. Edema:absent  Neuro: Grossly nonfocal        ======================================================  TREADMILL STRESS  No results found for this or any previous visit.   "   ----------------------------------------------------------------------------------------------  NUCLEAR STRESS TEST: No results found for this or any previous visit.    Results for orders placed during the hospital encounter of 01/04/23    NM myocardial perfusion spect (rx stress and/or rest)    Interpretation Summary    Stress ECG: No ST deviation is noted. The ECG was negative for ischemia. The stress ECG is negative for ischemia after vasodilation and low level exercise, without reproduction of symptoms.    Perfusion: There is a left ventricular perfusion defect that is medium in size with mild reduction in uptake present in the basal to mid anterior location(s) that is reversible.    Stress Function: Left ventricular function post-stress is normal. Post-stress ejection fraction is 77 %.    Stress Combined Conclusion: The ECG and SPECT imaging portions of the stress study are discordant but are nonetheless concerning for inducible myocardial ischemia given the known limited sensitivity of stress electrocardiography. Left ventricular perfusion is abnormal. There is probable mild ischemia.in the basal and mid segments of the anterior wall.      --------------------------------------------------------------------------------  CATH:  No results found for this or any previous visit.    --------------------------------------------------------------------------------  ECHO:   No results found for this or any previous visit.    No results found for this or any previous visit.    --------------------------------------------------------------------------------  HOLTER  No results found for this or any previous visit.    --------------------------------------------------------------------------------  CAROTIDS  Results for orders placed during the hospital encounter of 02/28/19    VAS carotid complete study    Narrative  [facility] REPORT  CLINICAL:  Indications:  Visual disturbance.  Patient experienced  "\"kaleidoscopic tonio\" in her field of vision.  These  episodes happened several times between the months of November and December but  have since the new year.  Risk Factors  The patient has history of HTN, HLD and smoking (current) 0.75 ppd.  Clinical  Right Pressure:  130/ mm Hg, Left Pressure:  122/ mm Hg.    FINDINGS:    Right        Impression  PSV  EDV (cm/s)  Direction of Flow  Ratio  Dist. ICA                 57          25                      0.60  Mid. ICA                  79          34                      0.84  Prox. ICA    1 - 49%      72          26                      0.77  Dist CCA                  66          32  Mid CCA                   94          29                      0.99  Prox CCA                  96          26  Ext Carotid               95          24                      1.01  Prox Vert                 36          15  Antegrade  Subclavian                70           0    Left         Impression  PSV  EDV (cm/s)  Direction of Flow  Ratio  Dist. ICA                 45          20                      0.49  Mid. ICA                  71          31                      0.79  Prox. ICA    1 - 49%      80          30                      0.89  Dist CCA                  85          32  Mid CCA                   91          31                      0.91  Prox CCA                 100          28  Ext Carotid               80          15                      0.89  Prox Vert                 36          12  Antegrade  Subclavian               200           0        CONCLUSION:    Impression  RIGHT:  There is <50% stenosis noted in the internal carotid artery. Plaque is  homogenous and smooth.  Vertebral artery flow is antegrade. There is no significant subclavian artery  disease.  LEFT:  There is <50% stenosis noted in the internal carotid artery. Plaque is  homogenous and smooth.  Vertebral artery flow is antegrade. There is no significant subclavian artery  disease.    Internal carotid artery " stenosis determination by consensus criteria from:  SHAREE Lizarraga, et.al. Carotid Artery Stenosis: Gray-Scale and Doppler US Diagnosis  - Society of Radiologists in Ultrasound Consensus Conference, Radiology 2003;  [id number].    SIGNATURE:  Electronically Signed by: ANDREA VAL on 2019-02-28 03:52:38 PM       There are no diagnoses linked to this encounter.     ======================================================          Review of Systems  ROS as noted above, otherwise 12 point review of systems was performed and is negative.     Historical Information   Past Medical History:   Diagnosis Date    Anxiety     Chronic kidney disease, stage III (moderate) (HCC)     Depression     Elevated hemoglobin A1c     Fluid retention     GERD (gastroesophageal reflux disease) 30 years ago    Graves disease     Hyperlipidemia     Hypertension     Hypertension, essential     Hypothyroidism     Insomnia     Migraine     Osteoporosis     Prediabetes      Past Surgical History:   Procedure Laterality Date    THYROIDECTOMY       Social History     Substance and Sexual Activity   Alcohol Use Not Currently    Comment: rare     Social History     Substance and Sexual Activity   Drug Use Never     Social History     Tobacco Use   Smoking Status Former    Current packs/day: 1.00    Average packs/day: 1 pack/day for 54.1 years (54.1 ttl pk-yrs)    Types: Cigarettes    Start date: 1971   Smokeless Tobacco Never   Tobacco Comments    Smokes a pack a day     Family History   Problem Relation Age of Onset    Diabetes Mother     Parkinsonism Father     Diabetes Sister     Breast cancer Sister         bi-lateral masectomy     Dementia Sister         frontoemporal demantia, age 60     Diabetes Brother        Meds/Allergies   Hospital Medications:   No current facility-administered medications for this visit.    Home Medications: Not in a hospital admission.      No Known Allergies      Portions of the record may have been created with voice  "recognition software.  Occasional wrong words or \"sound a like\" substitutions may have occurred due to the inherent limitations of voice recognition software.  Read the chart carefully and recognize, using context, where substitutions have occurred.        I spent > 35 mins examining and interviewing the patient, coordinating care, reviewing the chart, reviewing testing and writing the note.                   "

## 2025-04-11 DIAGNOSIS — R00.0 TACHYCARDIA: ICD-10-CM

## 2025-04-11 DIAGNOSIS — E78.5 HYPERLIPIDEMIA, UNSPECIFIED HYPERLIPIDEMIA TYPE: ICD-10-CM

## 2025-04-11 RX ORDER — EZETIMIBE 10 MG/1
5 TABLET ORAL DAILY
Qty: 45 TABLET | Refills: 1 | Status: SHIPPED | OUTPATIENT
Start: 2025-04-11

## 2025-04-17 DIAGNOSIS — R10.13 EPIGASTRIC PAIN: ICD-10-CM

## 2025-04-17 RX ORDER — OMEPRAZOLE 40 MG/1
40 CAPSULE, DELAYED RELEASE ORAL DAILY
Qty: 90 CAPSULE | Refills: 0 | Status: SHIPPED | OUTPATIENT
Start: 2025-04-17

## 2025-07-16 DIAGNOSIS — E78.5 HYPERLIPIDEMIA, UNSPECIFIED HYPERLIPIDEMIA TYPE: ICD-10-CM

## 2025-07-16 RX ORDER — ROSUVASTATIN CALCIUM 20 MG/1
20 TABLET, COATED ORAL DAILY
Qty: 90 TABLET | Refills: 1 | Status: SHIPPED | OUTPATIENT
Start: 2025-07-16

## 2025-07-29 DIAGNOSIS — R00.0 TACHYCARDIA: ICD-10-CM

## 2025-07-30 RX ORDER — METOPROLOL SUCCINATE 25 MG/1
25 TABLET, EXTENDED RELEASE ORAL DAILY
Qty: 90 TABLET | Refills: 1 | Status: SHIPPED | OUTPATIENT
Start: 2025-07-30